# Patient Record
Sex: MALE | Race: BLACK OR AFRICAN AMERICAN | URBAN - METROPOLITAN AREA
[De-identification: names, ages, dates, MRNs, and addresses within clinical notes are randomized per-mention and may not be internally consistent; named-entity substitution may affect disease eponyms.]

---

## 2018-08-20 ENCOUNTER — OFFICE VISIT (OUTPATIENT)
Dept: INTERNAL MEDICINE CLINIC | Age: 68
End: 2018-08-20

## 2018-08-20 VITALS
SYSTOLIC BLOOD PRESSURE: 114 MMHG | WEIGHT: 210.3 LBS | TEMPERATURE: 98.1 F | OXYGEN SATURATION: 97 % | HEIGHT: 70 IN | HEART RATE: 63 BPM | BODY MASS INDEX: 30.11 KG/M2 | RESPIRATION RATE: 18 BRPM | DIASTOLIC BLOOD PRESSURE: 68 MMHG

## 2018-08-20 DIAGNOSIS — Z79.4 CONTROLLED TYPE 2 DIABETES MELLITUS WITH BOTH EYES AFFECTED BY MODERATE NONPROLIFERATIVE RETINOPATHY WITHOUT MACULAR EDEMA, WITH LONG-TERM CURRENT USE OF INSULIN (HCC): Primary | ICD-10-CM

## 2018-08-20 DIAGNOSIS — R19.5 OCCULT BLOOD IN STOOLS: ICD-10-CM

## 2018-08-20 DIAGNOSIS — E78.5 DYSLIPIDEMIA: ICD-10-CM

## 2018-08-20 DIAGNOSIS — I10 ESSENTIAL HYPERTENSION: ICD-10-CM

## 2018-08-20 DIAGNOSIS — N40.0 BENIGN PROSTATIC HYPERPLASIA WITHOUT LOWER URINARY TRACT SYMPTOMS: ICD-10-CM

## 2018-08-20 DIAGNOSIS — E11.3393 CONTROLLED TYPE 2 DIABETES MELLITUS WITH BOTH EYES AFFECTED BY MODERATE NONPROLIFERATIVE RETINOPATHY WITHOUT MACULAR EDEMA, WITH LONG-TERM CURRENT USE OF INSULIN (HCC): Primary | ICD-10-CM

## 2018-08-20 DIAGNOSIS — E03.9 ACQUIRED HYPOTHYROIDISM: ICD-10-CM

## 2018-08-20 RX ORDER — SIMVASTATIN 20 MG/1
20 TABLET, FILM COATED ORAL
COMMUNITY
End: 2018-12-26 | Stop reason: SDUPTHER

## 2018-08-20 RX ORDER — GUAIFENESIN 100 MG/5ML
81 LIQUID (ML) ORAL DAILY
COMMUNITY

## 2018-08-20 RX ORDER — METFORMIN HYDROCHLORIDE 500 MG/1
500 TABLET ORAL 2 TIMES DAILY WITH MEALS
COMMUNITY
End: 2019-07-01 | Stop reason: CLARIF

## 2018-08-20 RX ORDER — TAMSULOSIN HYDROCHLORIDE 0.4 MG/1
0.4 CAPSULE ORAL
COMMUNITY
End: 2018-11-05 | Stop reason: SDUPTHER

## 2018-08-20 RX ORDER — CHOLECALCIFEROL (VITAMIN D3) 125 MCG
100 CAPSULE ORAL DAILY
COMMUNITY

## 2018-08-20 RX ORDER — LISINOPRIL 20 MG/1
20 TABLET ORAL DAILY
COMMUNITY
End: 2019-07-05 | Stop reason: SDUPTHER

## 2018-08-20 RX ORDER — LEVOTHYROXINE SODIUM 100 UG/1
100 TABLET ORAL
COMMUNITY
End: 2019-06-03 | Stop reason: SDUPTHER

## 2018-08-20 NOTE — MR AVS SNAPSHOT
[facility] 
 
 
 [address] [address] 
[phone] Patient: Michelle Call MRN: [id number] BVW:[id number] Visit Information Date & Time Provider Department Dept. Phone Encounter #  
 8/20/2018 11:00 AM MD Fernanda Zamor[facility] [phone] [id number] Your Appointments 11/19/2018 11:30 AM  
Any with Bryan Seals MD  
[address] [place] [address] and Primary Care [facility] Appt Note: 3 MONTH FOLLOW UP  
 [address] [facility]  
  
   
 [address] [address] Upcoming Health Maintenance Date Due Hepatitis C Screening 1950 DTaP/Tdap/Td series (1 - Tdap) 4/30/1972 FOBT Q 1 YEAR AGE 50-75 4/30/2001 ZOSTER VACCINE AGE 60> 2/28/2011 GLAUCOMA SCREENING Q2Y 4/30/2016 Pneumococcal 65+ Low/Medium Risk (1 of 2 - PCV13) 4/30/2016 Influenza Age 5 to Adult 8/1/2018 Allergies as of 8/20/2018  Review Complete On: 8/20/2018 By: Pal Terrell No Known Allergies Current Immunizations  Never Reviewed No immunizations on file. Not reviewed this visit You Were Diagnosed With   
  
 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 
ICD-9-CM: 401.9 Dyslipidemia     ICD-10-CM: E78.5 ICD-9-CM: 272.4 Benign prostatic hyperplasia without lower urinary tract symptoms     ICD-10-CM: N40.0 ICD-9-CM: 600.00 Acquired hypothyroidism     ICD-10-CM: E03.9 ICD-9-CM: 244.9 Occult blood in stools     ICD-10-CM: R19.5 ICD-9-CM: 792.1 Vitals BP Pulse Temp Resp Height(growth percentile) Weight(growth percentile) 114/68 63 98.1 °F (36.7 °C) (Oral) 18 5' 10\" (1.778 m) 210 lb 4.8 oz (95.4 kg) SpO2 BMI Smoking Status 97% 30.17 kg/m2 Former Smoker Vitals History BMI and BSA Data Body Mass Index Body Surface Area  
 30.17 kg/m 2 2.17 m 2 Preferred Pharmacy Pharmacy Name Phone [facility], [address] [phone] Your Updated Medication List  
  
   
This list is accurate as of 8/20/18 12:55 PM.  Always use your most recent med list.  
  
  
  
  
 AGATHA CHEWABLE ASPIRIN 81 mg chewable tablet Generic drug:  aspirin Take 81 mg by mouth daily. COMPLETE MULTIVITAMIN PO Take 1 Tab by mouth daily. COQ-10 100 mg capsule Generic drug:  co-enzyme Q-10 Take 100 mg by mouth daily. FREESTYLE LITE METER  
by Does Not Apply route. FREESTYLE LITE STRIPS  
by In Vitro route. HumuLIN 70/30 U-100 Insulin 100 unit/mL (70-30) injection Generic drug:  insulin NPH/insulin regular  
by SubCUTAneous route Before breakfast and dinner. 10 units in the morning and 12 units at night. levothyroxine 100 mcg tablet Commonly known as:  SYNTHROID Take 100 mcg by mouth Daily (before breakfast). lisinopril 20 mg tablet Commonly known as:  Altman Sturgeon Take 20 mg by mouth daily. metFORMIN 1,000 mg tablet Commonly known as:  GLUCOPHAGE Take 1,000 mg by mouth two (2) times daily (with meals). simvastatin 20 mg tablet Commonly known as:  ZOCOR Take 20 mg by mouth nightly. tamsulosin 0.4 mg capsule Commonly known as:  FLOMAX Take 0.4 mg by mouth nightly. We Performed the Following AMB POC EKG ROUTINE W/ 12 LEADS, INTER & REP [68413 CPT(R)] APOLIPOPROTEIN B [id number] CPT(R)] CBC WITH AUTOMATED DIFF [60331 CPT(R)] COLLECTION VENOUS BLOOD,VENIPUNCTURE [id number] CPT(R)] HEMOGLOBIN A1C WITH EAG [42168 CPT(R)] LIPID PANEL [94120 CPT(R)] METABOLIC PANEL, COMPREHENSIVE [79355 CPT(R)] MICROALBUMIN, UR, RAND W/ MICROALB/CREAT RATIO [id number] CPT(R)] OCCULT BLOOD IMMUNOASSAY,DIAGNOSTIC [85247 CPT(R)] PSA, DIAGNOSTIC (PROSTATE SPECIFIC AG) [id number] CPT(R)] TSH 3RD GENERATION [55535 CPT(R)] Introducing [facility]! [facility] introduces [employer] patient portal. Now you can access parts of your medical record, email your doctor's office, and request medication refills online. 1. In your internet browser, go to [email or web address] 2. Click on the First Time User? Click Here link in the Sign In box. You will see the New Member Sign Up page. 3. Enter your [employer] Access Code exactly as it appears below. You will not need to use this code after youve completed the sign-up process. If you do not sign up before the expiration date, you must request a new code. · [employer] Access Code: THE [facility] Expires: 11/18/2018 12:55 PM 
 
4. Enter the last four digits of your Social Security Number (xxxx) and Date of Birth (mm/dd/yyyy) as indicated and click Submit. You will be taken to the next sign-up page. 5. Create a [employer] ID. This will be your [employer] login ID and cannot be changed, so think of one that is secure and easy to remember. 6. Create a [employer] password. You can change your password at any time. 7. Enter your Password Reset Question and Answer. This can be used at a later time if you forget your password. 8. Enter your e-mail address. You will receive e-mail notification when new information is available in [address] 9. Click Sign Up. You can now view and download portions of your medical record. 10. Click the Download Summary menu link to download a portable copy of your medical information. If you have questions, please visit the Frequently Asked Questions section of the [employer] website. Remember, [employer] is NOT to be used for urgent needs. For medical emergencies, dial 911. Now available from your iPhone and Android! Please provide this summary of care documentation to your next provider. Your primary care clinician is listed as Benito Mckeon. If you have any questions after today's visit, please call [phone].

## 2018-08-20 NOTE — PROGRESS NOTES
Chief Complaint   Patient presents with    New Patient     referral request to eye doctor, hx of diabetes, thyroid, HTN     1. Have you been to the ER, urgent care clinic since your last visit? Hospitalized since your last visit? No    2. Have you seen or consulted any other health care providers outside of the [address] [place] [address] since your last visit? Include any pap smears or colon screening.  No

## 2018-08-21 LAB
ALBUMIN SERPL-MCNC: 4.2 G/DL (ref 3.6–4.8)
ALBUMIN/CREAT UR: 2.7 MG/G CREAT (ref 0–30)
ALBUMIN/GLOB SERPL: 1.8 {RATIO} (ref 1.2–2.2)
ALP SERPL-CCNC: 111 IU/L (ref 39–117)
ALT SERPL-CCNC: 35 IU/L (ref 0–44)
APO B SERPL-MCNC: 94 MG/DL (ref 52–135)
AST SERPL-CCNC: 27 IU/L (ref 0–40)
BASOPHILS # BLD AUTO: 0 X10E3/UL (ref 0–0.2)
BASOPHILS NFR BLD AUTO: 1 %
BILIRUB SERPL-MCNC: 0.6 MG/DL (ref 0–1.2)
BUN SERPL-MCNC: 14 MG/DL (ref 8–27)
BUN/CREAT SERPL: 13 (ref 10–24)
CALCIUM SERPL-MCNC: 9.5 MG/DL (ref 8.6–10.2)
CHLORIDE SERPL-SCNC: 103 MMOL/L (ref 96–106)
CHOLEST SERPL-MCNC: 136 MG/DL (ref 100–199)
CO2 SERPL-SCNC: 25 MMOL/L (ref 20–29)
CREAT SERPL-MCNC: 1.09 MG/DL (ref 0.76–1.27)
CREAT UR-MCNC: 181.9 MG/DL
EOSINOPHIL # BLD AUTO: 0.1 X10E3/UL (ref 0–0.4)
EOSINOPHIL NFR BLD AUTO: 3 %
ERYTHROCYTE [DISTWIDTH] IN BLOOD BY AUTOMATED COUNT: 14 % (ref 12.3–15.4)
EST. AVERAGE GLUCOSE BLD GHB EST-MCNC: 192 MG/DL
GLOBULIN SER CALC-MCNC: 2.4 G/DL (ref 1.5–4.5)
GLUCOSE SERPL-MCNC: 114 MG/DL (ref 65–99)
HBA1C MFR BLD: 8.3 % (ref 4.8–5.6)
HCT VFR BLD AUTO: 44.5 % (ref 37.5–51)
HDLC SERPL-MCNC: 38 MG/DL
HGB BLD-MCNC: 15 G/DL (ref 13–17.7)
IMM GRANULOCYTES # BLD: 0 X10E3/UL (ref 0–0.1)
IMM GRANULOCYTES NFR BLD: 0 %
LDLC SERPL CALC-MCNC: 81 MG/DL (ref 0–99)
LYMPHOCYTES # BLD AUTO: 2 X10E3/UL (ref 0.7–3.1)
LYMPHOCYTES NFR BLD AUTO: 46 %
MCH RBC QN AUTO: 29.2 PG (ref 26.6–33)
MCHC RBC AUTO-ENTMCNC: 33.7 G/DL (ref 31.5–35.7)
MCV RBC AUTO: 87 FL (ref 79–97)
MICROALBUMIN UR-MCNC: 4.9 UG/ML
MONOCYTES # BLD AUTO: 0.3 X10E3/UL (ref 0.1–0.9)
MONOCYTES NFR BLD AUTO: 7 %
NEUTROPHILS # BLD AUTO: 1.8 X10E3/UL (ref 1.4–7)
NEUTROPHILS NFR BLD AUTO: 43 %
PLATELET # BLD AUTO: 265 X10E3/UL (ref 150–379)
POTASSIUM SERPL-SCNC: 4.7 MMOL/L (ref 3.5–5.2)
PROT SERPL-MCNC: 6.6 G/DL (ref 6–8.5)
PSA SERPL-MCNC: 0.3 NG/ML (ref 0–4)
RBC # BLD AUTO: 5.14 X10E6/UL (ref 4.14–5.8)
SODIUM SERPL-SCNC: 142 MMOL/L (ref 134–144)
TRIGL SERPL-MCNC: 86 MG/DL (ref 0–149)
TSH SERPL DL<=0.005 MIU/L-ACNC: 2.03 UIU/ML (ref 0.45–4.5)
VLDLC SERPL CALC-MCNC: 17 MG/DL (ref 5–40)
WBC # BLD AUTO: 4.3 X10E3/UL (ref 3.4–10.8)

## 2018-08-26 NOTE — ASSESSMENT & PLAN NOTE
Stable, based on history, physical exam and review of pertinent labs, studies and medications; meds reconciled; lifestyle modifications recommended, medication compliance emphasized. Key Antihyperglycemic Medications             metFORMIN (GLUCOPHAGE) 1,000 mg tablet  (Taking) Take 1,000 mg by mouth two (2) times daily (with meals). insulin NPH/insulin regular (HUMULIN 70/30 U-100 INSULIN) 100 unit/mL (70-30) injection  (Taking) by SubCUTAneous route Before breakfast and dinner. 10 units in the morning and 12 units at night. Other Key Diabetic Medications             lisinopril (PRINIVIL, ZESTRIL) 20 mg tablet  (Taking) Take 20 mg by mouth daily. simvastatin (ZOCOR) 20 mg tablet  (Taking) Take 20 mg by mouth nightly.         Lab Results   Component Value Date/Time    Hemoglobin A1c 8.3 08/20/2018 12:42 PM    Glucose 114 08/20/2018 12:42 PM    Creatinine 1.09 08/20/2018 12:42 PM    Microalb/Creat ratio (ug/mg creat.) 2.7 08/20/2018 12:42 PM    Cholesterol, total 136 08/20/2018 12:42 PM    HDL Cholesterol 38 08/20/2018 12:42 PM    LDL, calculated 81 08/20/2018 12:42 PM    Triglyceride 86 08/20/2018 12:42 PM     Diabetic Foot and Eye Exam HM Status   Topic Date Due    Diabetic Foot Care  04/30/1961    Eye Exam  04/30/1960

## 2018-08-26 NOTE — PROGRESS NOTES
[facility]  [facility]  [address] [facility] [address]  Phone:  [phone]  Fax: [phone]       Chief Complaint   Patient presents with    New Patient     referral request to eye doctor, hx of diabetes, thyroid, HTN   . SUBJECTIVE:    Danis Hall is a 80 y.o. male comes in as a new patient. He has had diabetes for well over 40 years. His control ha been good. He has had microvascular complications consisting of laser treatments years ago for his eyes. Apparently they have been stable of late. He states that his overall control is good with average sugars in the low 100 range ac breakfast and dinner. He has not had any interventional hypoglycemia. He has been taking premixed insulin. Unfortunately, his last dose has been taken at hs time which is inappropriate. He has had hypertension for at least 20+ years. He is also on a statin in view of his increased cardiovascular risk for at least 10+ years. Finally, he has had hypothyroidism for the last ten years. This was not pharmacologically induced. Current Outpatient Prescriptions   Medication Sig Dispense Refill    metFORMIN (GLUCOPHAGE) 1,000 mg tablet Take 1,000 mg by mouth two (2) times daily (with meals).  blood-glucose meter (FREESTYLE LITE METER) by Does Not Apply route.  blood sugar diagnostic (FREESTYLE LITE STRIPS) by In Vitro route.  co-enzyme Q-10 (COQ-10) 100 mg capsule Take 100 mg by mouth daily.  lisinopril (PRINIVIL, ZESTRIL) 20 mg tablet Take 20 mg by mouth daily.  aspirin (AGATHA CHEWABLE ASPIRIN) 81 mg chewable tablet Take 81 mg by mouth daily.  tamsulosin (FLOMAX) 0.4 mg capsule Take 0.4 mg by mouth nightly.  simvastatin (ZOCOR) 20 mg tablet Take 20 mg by mouth nightly.  levothyroxine (SYNTHROID) 100 mcg tablet Take 100 mcg by mouth Daily (before breakfast).       multivit,tx with iron,minerals (COMPLETE MULTIVITAMIN PO) Take 1 Tab by mouth daily.  insulin NPH/insulin regular (HUMULIN 70/30 U-100 INSULIN) 100 unit/mL (70-30) injection by SubCUTAneous route Before breakfast and dinner. 10 units in the morning and 12 units at night.        Past Medical History:   Diagnosis Date    Diabetes ([address]     Hypertension      Past Surgical History:   Procedure Laterality Date    ABDOMEN SURGERY PROC UNLISTED      hemmoroidectomy    HX COLONOSCOPY  2015    Joe----unremarkable    HX HEENT      laser for retinopathy    HX ORTHOPAEDIC      achilles tendon surgery    HX REFRACTIVE SURGERY       No Known Allergies      REVIEW OF SYSTEMS:  General: negative for - chills or fever  ENT: negative for - headaches, nasal congestion or tinnitus  Respiratory: negative for - cough, hemoptysis, shortness of breath or wheezing  Cardiovascular : negative for - chest pain, edema, palpitations or shortness of breath  Gastrointestinal: negative for - abdominal pain, blood in stools, heartburn or nausea/vomiting  Genito-Urinary: no dysuria, trouble voiding, or hematuria  Musculoskeletal: negative for - gait disturbance, joint pain, joint stiffness or joint swelling  Neurological: no TIA or stroke symptoms  Hematologic: no bruises, no bleeding, no swollen glands  Integument: no lumps, mole changes, nail changes or rash  Endocrine: no malaise/lethargy or unexpected weight changes      Social History     Social History    Marital status: UNKNOWN     Spouse name: N/A    Number of children: 4    Years of education: N/A     Occupational History    retired [place] [address] Alonso Rosales retired [employer] after 5 years      Social History Main Topics    Smoking status: Former Smoker    Smokeless tobacco: Never Used    Alcohol use 0.6 oz/week     1 Glasses of wine per week    Drug use: No    Sexual activity: Yes     Other Topics Concern    None     Social History Narrative     Family History   Problem Relation Age of Onset    Heart Disease Mother OBJECTIVE:    Visit Vitals    /68    Pulse 63    Temp 98.1 °F (36.7 °C) (Oral)    Resp 18    Ht 5' 10\" (1.778 m)    Wt 210 lb 4.8 oz (95.4 kg)    SpO2 97%    BMI 30.17 kg/m2     CONSTITUTIONAL: well , well nourished, appears age appropriate  EYES: perrla, eom intact  ENMT:moist mucous membranes, pharynx clear  NECK: supple. Thyroid normal  RESPIRATORY: Chest: clear to ascultation and percussion   CARDIOVASCULAR: Heart: regular rate and rhythm  GASTROINTESTINAL: Abdomen: soft, bowel sounds active  HEMATOLOGIC: no pathological lymph nodes palpated  MUSCULOSKELETAL: Extremities: no edema, pulse 1+   INTEGUMENT: No unusual rashes or suspicious skin lesions noted. Nails appear normal.  NEUROLOGIC: non-focal exam   MENTAL STATUS: alert and oriented, appropriate affect      ASSESSMENT:  1. 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 Essential hypertension    3. Dyslipidemia    4. Acquired hypothyroidism    5. Benign prostatic hyperplasia without lower urinary tract symptoms    6. Occult blood in stools        PLAN:    1. His diabetes hopefully is well controlled. I will await the results of the hemoglobin A1c. I do remind him of the importance of taking his premixed insulin before meals. This means ac breakfast and dinner. 2. Blood pressure is excellent today, no adjustments are made. 3. I will await the results of his lipid profile to see if any further adjustments need occur. 4. He is hypothyroid and he is compensated currently. I will await the results of his TSH to determine if adjustments need be made. 5. He seems to be quite physically active and I encouraged him to continue this.       .  Orders Placed This Encounter    APOLIPOPROTEIN B    CBC WITH AUTOMATED DIFF    LIPID PANEL    METABOLIC PANEL, COMPREHENSIVE    PROSTATE SPECIFIC AG    HEMOGLOBIN A1C WITH EAG    MICROALBUMIN, UR, RAND W/ MICROALB/CREAT RATIO    OCCULT BLOOD IMMUNOASSAY,DIAGNOSTIC    TSH 3RD GENERATION    AMB POC EKG ROUTINE W/ 12 LEADS, INTER & REP    metFORMIN (GLUCOPHAGE) 1,000 mg tablet    blood-glucose meter (FREESTYLE LITE METER)    blood sugar diagnostic (FREESTYLE LITE STRIPS)    co-enzyme Q-10 (COQ-10) 100 mg capsule    lisinopril (PRINIVIL, ZESTRIL) 20 mg tablet    aspirin (AGATHA CHEWABLE ASPIRIN) 81 mg chewable tablet    tamsulosin (FLOMAX) 0.4 mg capsule    simvastatin (ZOCOR) 20 mg tablet    levothyroxine (SYNTHROID) 100 mcg tablet    multivit,tx with iron,minerals (COMPLETE MULTIVITAMIN PO)    insulin NPH/insulin regular (HUMULIN 70/30 U-100 INSULIN) 100 unit/mL (70-30) injection         Follow-up Disposition:  Return in about 3 months (around 11/20/2018).       Zuleika Huggins MD

## 2018-09-07 LAB — HEMOCCULT STL QL IA: NEGATIVE

## 2018-09-11 ENCOUNTER — OFFICE VISIT (OUTPATIENT)
Dept: INTERNAL MEDICINE CLINIC | Age: 68
End: 2018-09-11

## 2018-09-11 VITALS
DIASTOLIC BLOOD PRESSURE: 70 MMHG | RESPIRATION RATE: 16 BRPM | BODY MASS INDEX: 30.56 KG/M2 | WEIGHT: 213.5 LBS | HEART RATE: 63 BPM | TEMPERATURE: 98 F | OXYGEN SATURATION: 96 % | SYSTOLIC BLOOD PRESSURE: 122 MMHG | HEIGHT: 70 IN

## 2018-09-11 DIAGNOSIS — Z00.00 WELLNESS EXAMINATION: ICD-10-CM

## 2018-09-11 DIAGNOSIS — I10 ESSENTIAL HYPERTENSION: ICD-10-CM

## 2018-09-11 DIAGNOSIS — Z79.4 CONTROLLED TYPE 2 DIABETES MELLITUS WITH BOTH EYES AFFECTED BY MODERATE NONPROLIFERATIVE RETINOPATHY WITHOUT MACULAR EDEMA, WITH LONG-TERM CURRENT USE OF INSULIN (HCC): Primary | ICD-10-CM

## 2018-09-11 DIAGNOSIS — Z23 ENCOUNTER FOR IMMUNIZATION: ICD-10-CM

## 2018-09-11 DIAGNOSIS — Z13.31 SCREENING FOR DEPRESSION: ICD-10-CM

## 2018-09-11 DIAGNOSIS — Z13.39 SCREENING FOR ALCOHOLISM: ICD-10-CM

## 2018-09-11 DIAGNOSIS — E11.3393 CONTROLLED TYPE 2 DIABETES MELLITUS WITH BOTH EYES AFFECTED BY MODERATE NONPROLIFERATIVE RETINOPATHY WITHOUT MACULAR EDEMA, WITH LONG-TERM CURRENT USE OF INSULIN (HCC): Primary | ICD-10-CM

## 2018-09-11 DIAGNOSIS — E78.5 DYSLIPIDEMIA: ICD-10-CM

## 2018-09-11 DIAGNOSIS — E66.09 CLASS 1 OBESITY DUE TO EXCESS CALORIES WITH SERIOUS COMORBIDITY AND BODY MASS INDEX (BMI) OF 30.0 TO 30.9 IN ADULT: ICD-10-CM

## 2018-09-11 LAB — GLUCOSE POC: 99 MG/DL

## 2018-09-11 NOTE — MR AVS SNAPSHOT
Paul Mayo 
 
 
 [address] [address] 
[phone] Patient: Aleida Velasco MRN: [id number] Visit Information Date & Time Provider Department Dept. Phone Encounter #  
 9/11/2018 11:30 AM Kayode Marc MD [facility] and [place] [address] [id number] Your Appointments 11/19/2018 11:30 AM  
Any with Kayode Marc MD  
[address] [place] [address] and Primary Care [address] [place] [address]) Appt Note: 3 MONTH FOLLOW UP  
 [address] 1 [facility]  
  
   
 [address] [id number] Upcoming Health Maintenance Date Due Hepatitis C Screening 1950 FOOT EXAM Q1 4/30/1961 DTaP/Tdap/Td series (1 - Tdap) 4/30/1972 ZOSTER VACCINE AGE 60> 2/28/2011 GLAUCOMA SCREENING Q2Y 4/30/2016 Influenza Age 5 to Adult 8/1/2018 EYE EXAM RETINAL OR DILATED Q1 10/11/2018* Pneumococcal 65+ Low/Medium Risk (1 of 2 - PCV13) 3/11/2019* HEMOGLOBIN A1C Q6M 2/20/2019 MICROALBUMIN Q1 8/20/2019 LIPID PANEL Q1 8/20/2019 FOBT Q 1 YEAR AGE 50-75 9/4/2019 *Topic was postponed. The date shown is not the original due date. Allergies as of 9/11/2018  Review Complete On: 9/11/2018 By: Paola Mathew No Known Allergies Current Immunizations  Never Reviewed Name Date Influenza High Dose Vaccine PF  Incomplete Not reviewed this visit You Were Diagnosed With   
  
 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     ICD-10-CM: B34.0658, Z79.4 ICD-9-CM: 250.50, 362.05, V58.67 Vitals BP Pulse Temp Resp Height(growth percentile) Weight(growth percentile) 122/70 63 98 °F (36.7 °C) (Oral) 16 5' 10\" (1.778 m) 213 lb 8 oz (96.8 kg) SpO2 BMI Smoking Status 96% 30.63 kg/m2 Former Smoker Vitals History BMI and BSA Data Body Mass Index Body Surface Area  
 30.63 kg/m 2 2.19 m 2 Preferred Pharmacy Pharmacy Name Phone [facility] [address], [address] [phone] Your Updated Medication List  
  
   
This list is accurate as of 9/11/18 12:54 PM.  Always use your most recent med list.  
  
  
  
  
 AGATHA CHEWABLE ASPIRIN 81 mg chewable tablet Generic drug:  aspirin Take 81 mg by mouth daily. COMPLETE MULTIVITAMIN PO Take 1 Tab by mouth daily. COQ-10 100 mg capsule Generic drug:  co-enzyme Q-10 Take 100 mg by mouth daily. FREESTYLE LITE METER  
by Does Not Apply route. FREESTYLE LITE STRIPS  
by In Vitro route. HumuLIN 70/30 U-100 Insulin 100 unit/mL (70-30) injection Generic drug:  insulin NPH/insulin regular  
by SubCUTAneous route Before breakfast and dinner. 12 units in the morning and 15 units at night. levothyroxine 100 mcg tablet Commonly known as:  SYNTHROID Take 100 mcg by mouth Daily (before breakfast). lisinopril 20 mg tablet Commonly known as:  Bonnita Silk Take 20 mg by mouth daily. metFORMIN 1,000 mg tablet Commonly known as:  GLUCOPHAGE Take 1,000 mg by mouth two (2) times daily (with meals). simvastatin 20 mg tablet Commonly known as:  ZOCOR Take 20 mg by mouth nightly. tamsulosin 0.4 mg capsule Commonly known as:  FLOMAX Take 0.4 mg by mouth nightly. We Performed the Following AMB POC GLUCOSE BLOOD, BY GLUCOSE MONITORING DEVICE [14013 CPT(R)] INFLUENZA VIRUS VACCINE, HIGH DOSE SEASONAL, PRESERVATIVE FREE [41038 CPT(R)] [place] IMMUNIZ ADMIN,1 SINGLE/COMB VAC/TOXOID [id number] CPT(R)] Patient Instructions Vaccine Information Statement Influenza (Flu) Vaccine (Inactivated or Recombinant): What you need to know Many Vaccine Information Statements are available in Danish and other languages. See www.immunize.org/vis Hojas de Información Sobre Vacunas están disponibles en Español y en muchos otros idiomas. Visite www.immunize.org/vis 1. Why get vaccinated? Influenza (flu) is a contagious disease that spreads around the [facility] every year, usually between October and May. Flu is caused by influenza viruses, and is spread mainly by coughing, sneezing, and close contact. Anyone can get flu. Flu strikes suddenly and can last several days. Symptoms vary by age, but can include: 
 fever/chills  sore throat  muscle aches  fatigue  cough  headache  runny or stuffy nose Flu can also lead to pneumonia and blood infections, and cause diarrhea and seizures in children. If you have a medical condition, such as heart or lung disease, flu can make it worse. Flu is more dangerous for some people. Infants and young children, people 72years of age and older, pregnant women, and people with certain health conditions or a weakened immune system are at greatest risk. Each year thousands of people in the [facility] die from flu, and many more are hospitalized. Flu vaccine can: 
 keep you from getting flu, 
 make flu less severe if you do get it, and 
 keep you from spreading flu to your family and other people. 2. Inactivated and recombinant flu vaccines A dose of flu vaccine is recommended every flu season. Children 6 months through 6years of age may need two doses during the same flu season. Everyone else needs only one dose each flu season. Some inactivated flu vaccines contain a very small amount of a mercury-based preservative called thimerosal. Studies have not shown thimerosal in vaccines to be harmful, but flu vaccines that do not contain thimerosal are available. There is no live flu virus in flu shots. They cannot cause the flu. There are many flu viruses, and they are always changing. Each year a new flu vaccine is made to protect against three or four viruses that are likely to cause disease in the upcoming flu season. But even when the vaccine doesnt exactly match these viruses, it may still provide some protection Flu vaccine cannot prevent: 
 flu that is caused by a virus not covered by the vaccine, or 
 illnesses that look like flu but are not. It takes about 2 weeks for protection to develop after vaccination, and protection lasts through the flu season. 3. Some people should not get this vaccine Tell the person who is giving you the vaccine:  If you have any severe, life-threatening allergies. If you ever had a life-threatening allergic reaction after a dose of flu vaccine, or have a severe allergy to any part of this vaccine, you may be advised not to get vaccinated. Most, but not all, types of flu vaccine contain a small amount of egg protein.  If you ever had Guillain-Barré Syndrome (also called GBS). Some people with a history of GBS should not get this vaccine. This should be discussed with your doctor.  If you are not feeling well. It is usually okay to get flu vaccine when you have a mild illness, but you might be asked to come back when you feel better. 4. Risks of a vaccine reaction With any medicine, including vaccines, there is a chance of reactions. These are usually mild and go away on their own, but serious reactions are also possible. Most people who get a flu shot do not have any problems with it. Minor problems following a flu shot include:  
 soreness, redness, or swelling where the shot was given  hoarseness  sore, red or itchy eyes  cough  fever  aches  headache  itching  fatigue If these problems occur, they usually begin soon after the shot and last 1 or 2 days. More serious problems following a flu shot can include the following:  There may be a small increased risk of Guillain-Barré Syndrome (GBS) after inactivated flu vaccine. This risk has been estimated at 1 or 2 additional cases per million people vaccinated. This is much lower than the risk of severe complications from flu, which can be prevented by flu vaccine.  Young children who get the flu shot along with pneumococcal vaccine (PCV13) and/or DTaP vaccine at the same time might be slightly more likely to have a seizure caused by fever. Ask your doctor for more information. Tell your doctor if a child who is getting flu vaccine has ever had a seizure. Problems that could happen after any injected vaccine:  People sometimes faint after a medical procedure, including vaccination. Sitting or lying down for about 15 minutes can help prevent fainting, and injuries caused by a fall. Tell your doctor if you feel dizzy, or have vision changes or ringing in the ears.  Some people get severe pain in the shoulder and have difficulty moving the arm where a shot was given. This happens very rarely.  Any medication can cause a severe allergic reaction. Such reactions from a vaccine are very rare, estimated at about 1 in a million doses, and would happen within a few minutes to a few hours after the vaccination. As with any medicine, there is a very remote chance of a vaccine causing a serious injury or death. The safety of vaccines is always being monitored. For more information, visit: www.cdc.gov/vaccinesafety/ 
 
5. What if there is a serious reaction? What should I look for?  Look for anything that concerns you, such as signs of a severe allergic reaction, very high fever, or unusual behavior.  
 
Signs of a severe allergic reaction can include hives, swelling of the face and throat, difficulty breathing, a fast heartbeat, dizziness, and weakness  usually within a few minutes to a few hours after the vaccination. What should I do?  If you think it is a severe allergic reaction or other emergency that cant wait, call 9-1-1 and get the person to the nearest hospital. Otherwise, call your doctor.  Reactions should be reported to the Vaccine Adverse Event Reporting System (VAERS). Your doctor should file this report, or you can do it yourself through  the VAERS web site at www.vaers. [facility].gov, or by calling 8-746.801.2670. VAERS does not give medical advice. 6. The National Vaccine Injury Compensation Program 
 
The [facility] Vaccine Injury Compensation Program (VICP) is a federal program that was created to compensate people who may have been injured by certain vaccines. Persons who believe they may have been injured by a vaccine can learn about the program and about filing a claim by calling 1-950.855.5869 or visiting the [facility] [place] [address] website at www.[facility].gov/vaccinecompensation. There is a time limit to file a claim for compensation. 7. How can I learn more?  Ask your healthcare provider. He or she can give you the vaccine package insert or suggest other sources of information.  Call your local or state health department.  Contact the Centers for Disease Control and Prevention (CDC): 
- Call 6-313.257.7831 (1-800-CDC-INFO) or 
- Visit CDCs website at www.cdc.gov/flu Vaccine Information Statement Inactivated Influenza Vaccine 8/7/2015 
[address] [employer] [id number]-[id number] [facility] and [employer] Centers for Disease Control and Prevention Office Use Only Introducing [facility]! [facility] introduces [employer] patient portal. Now you can access parts of your medical record, email your doctor's office, and request medication refills online. 1. In your internet browser, go to [email or web address]. [email or web address] Chip 2. Click on the First Time User? Click Here link in the Sign In box.  You will see the New Member Sign Up page. 3. Enter your Hypercontextt Access Code exactly as it appears below. You will not need to use this code after youve completed the sign-up process. If you do not sign up before the expiration date, you must request a new code. · Hypercontextt Access Code: THE [facility] Expires: 11/18/2018 12:55 PM 
 
4. Enter the last four digits of your Social Security Number (xxxx) and Date of Birth (mm/dd/yyyy) as indicated and click Submit. You will be taken to the next sign-up page. 5. Create a Hypercontextt ID. This will be your [employer] login ID and cannot be changed, so think of one that is secure and easy to remember. 6. Create a [employer] password. You can change your password at any time. 7. Enter your Password Reset Question and Answer. This can be used at a later time if you forget your password. 8. Enter your e-mail address. You will receive e-mail notification when new information is available in [address]. 9. Click Sign Up. You can now view and download portions of your medical record. 10. Click the Download Summary menu link to download a portable copy of your medical information. If you have questions, please visit the Frequently Asked Questions section of the [employer] website. Remember, [employer] is NOT to be used for urgent needs. For medical emergencies, dial 911. Now available from your iPhone and Android! Please provide this summary of care documentation to your next provider. Your primary care clinician is listed as Benito Mckeon. If you have any questions after today's visit, please call [phone].

## 2018-09-11 NOTE — PROGRESS NOTES
Chief Complaint Patient presents with [facility] Annual Wellness Visit 1. Have you been to the ER, urgent care clinic since your last visit? Hospitalized since your last visit? No 
 
2. Have you seen or consulted any other health care providers outside of the [address] [place] [address] since your last visit? Include any pap smears or colon screening.  No

## 2018-09-11 NOTE — PATIENT INSTRUCTIONS
Vaccine Information Statement Influenza (Flu) Vaccine (Inactivated or Recombinant): What you need to know Many Vaccine Information Statements are available in Frisian and other languages. See www.immunize.org/vis Hojas de Información Sobre Vacunas están disponibles en Español y en muchos otros idiomas. Visite www.immunize.org/vis 1. Why get vaccinated? Influenza (flu) is a contagious disease that spreads around the [place] every year, usually between October and May. Flu is caused by influenza viruses, and is spread mainly by coughing, sneezing, and close contact. Anyone can get flu. Flu strikes suddenly and can last several days. Symptoms vary by age, but can include: 
 fever/chills  sore throat  muscle aches  fatigue  cough  headache  runny or stuffy nose Flu can also lead to pneumonia and blood infections, and cause diarrhea and seizures in children. If you have a medical condition, such as heart or lung disease, flu can make it worse. Flu is more dangerous for some people. Infants and young children, people 72years of age and older, pregnant women, and people with certain health conditions or a weakened immune system are at greatest risk. Each year thousands of people in the [facility] die from flu, and many more are hospitalized. Flu vaccine can: 
 keep you from getting flu, 
 make flu less severe if you do get it, and 
 keep you from spreading flu to your family and other people. 2. Inactivated and recombinant flu vaccines A dose of flu vaccine is recommended every flu season. Children 6 months through 6years of age may need two doses during the same flu season. Everyone else needs only one dose each flu season.   
 
 
Some inactivated flu vaccines contain a very small amount of a mercury-based preservative called thimerosal. Studies have not shown thimerosal in vaccines to be harmful, but flu vaccines that do not contain thimerosal are available. There is no live flu virus in flu shots. They cannot cause the flu. There are many flu viruses, and they are always changing. Each year a new flu vaccine is made to protect against three or four viruses that are likely to cause disease in the upcoming flu season. But even when the vaccine doesnt exactly match these viruses, it may still provide some protection Flu vaccine cannot prevent: 
 flu that is caused by a virus not covered by the vaccine, or 
 illnesses that look like flu but are not. It takes about 2 weeks for protection to develop after vaccination, and protection lasts through the flu season. 3. Some people should not get this vaccine Tell the person who is giving you the vaccine:  If you have any severe, life-threatening allergies. If you ever had a life-threatening allergic reaction after a dose of flu vaccine, or have a severe allergy to any part of this vaccine, you may be advised not to get vaccinated. Most, but not all, types of flu vaccine contain a small amount of egg protein.  If you ever had Guillain-Barré Syndrome (also called GBS). Some people with a history of GBS should not get this vaccine. This should be discussed with your doctor.  If you are not feeling well. It is usually okay to get flu vaccine when you have a mild illness, but you might be asked to come back when you feel better. 4. Risks of a vaccine reaction With any medicine, including vaccines, there is a chance of reactions. These are usually mild and go away on their own, but serious reactions are also possible. Most people who get a flu shot do not have any problems with it. Minor problems following a flu shot include:  
 soreness, redness, or swelling where the shot was given  hoarseness  sore, red or itchy eyes  cough  fever  aches  headache  itching  fatigue If these problems occur, they usually begin soon after the shot and last 1 or 2 days. More serious problems following a flu shot can include the following:  There may be a small increased risk of Guillain-Barré Syndrome (GBS) after inactivated flu vaccine. This risk has been estimated at 1 or 2 additional cases per million people vaccinated. This is much lower than the risk of severe complications from flu, which can be prevented by flu vaccine.  Young children who get the flu shot along with pneumococcal vaccine (PCV13) and/or DTaP vaccine at the same time might be slightly more likely to have a seizure caused by fever. Ask your doctor for more information. Tell your doctor if a child who is getting flu vaccine has ever had a seizure. Problems that could happen after any injected vaccine:  People sometimes faint after a medical procedure, including vaccination. Sitting or lying down for about 15 minutes can help prevent fainting, and injuries caused by a fall. Tell your doctor if you feel dizzy, or have vision changes or ringing in the ears.  Some people get severe pain in the shoulder and have difficulty moving the arm where a shot was given. This happens very rarely.  Any medication can cause a severe allergic reaction. Such reactions from a vaccine are very rare, estimated at about 1 in a million doses, and would happen within a few minutes to a few hours after the vaccination. As with any medicine, there is a very remote chance of a vaccine causing a serious injury or death. The safety of vaccines is always being monitored. For more information, visit: www.cdc.gov/vaccinesafety/ 
 
5. What if there is a serious reaction? What should I look for?  Look for anything that concerns you, such as signs of a severe allergic reaction, very high fever, or unusual behavior.  
 
Signs of a severe allergic reaction can include hives, swelling of the face and throat, difficulty breathing, a fast heartbeat, dizziness, and weakness  usually within a few minutes to a few hours after the vaccination. What should I do?  If you think it is a severe allergic reaction or other emergency that cant wait, call 9-1-1 and get the person to the nearest hospital. Otherwise, call your doctor.  Reactions should be reported to the Vaccine Adverse Event Reporting System (VAERS). Your doctor should file this report, or you can do it yourself through  the VAERS web site at www.vaers. [facility].gov, or by calling 3-421.315.5677. VAERS does not give medical advice. 6. The National Vaccine Injury Compensation Program 
 
The [facility] Vaccine Injury Compensation Program (VICP) is a federal program that was created to compensate people who may have been injured by certain vaccines. Persons who believe they may have been injured by a vaccine can learn about the program and about filing a claim by calling 8-828.392.7498 or visiting the [address] website at www.[facility].gov/vaccinecompensation. There is a time limit to file a claim for compensation. 7. How can I learn more?  Ask your healthcare provider. He or she can give you the vaccine package insert or suggest other sources of information.  Call your local or state health department.  Contact the Centers for Disease Control and Prevention (CDC): 
- Call 0-890.624.9972 (1-800-CDC-INFO) or 
- Visit CDCs website at www.cdc.gov/flu Vaccine Information Statement Inactivated Influenza Vaccine 8/7/2015 
42 SANTIAGO Balderas [id number] [facility] and [employer] Centers for Disease Control and Prevention Office Use Only Medicare Wellness Visit, Male The best way to live healthy is to have a lifestyle where you eat a well-balanced diet, exercise regularly, limit alcohol use, and quit all forms of tobacco/nicotine, if applicable. Regular preventive services are another way to keep healthy.  Preventive services (vaccines, screening tests, monitoring & exams) can help personalize your care plan, which helps you manage your own care. Screening tests can find health problems at the earliest stages, when they are easiest to treat. [address] Angel[address] Lakhani follows the current, evidence-based guidelines published by the [facility] William Dial ([facility]) when recommending preventive services for our patients. Because we follow these guidelines, sometimes recommendations change over time as research supports it. (For example, a prostate screening blood test is no longer routinely recommended for men with no symptoms.) Of course, you and your doctor may decide to screen more often for some diseases, based on your risk and co-morbidities (chronic disease you are already diagnosed with). Preventive services for you include: - Medicare offers their members a free annual wellness visit, which is time for you and your primary care provider to discuss and plan for your preventive service needs. Take advantage of this benefit every year! 
-All adults over age 72 should receive the recommended pneumonia vaccines. Current USPSTF guidelines recommend a series of two vaccines for the best pneumonia protection.  
-All adults should have a flu vaccine yearly and an ECG.  All adults age 61 and older should receive a shingles vaccine once in their lifetime.   
-All adults age 38-68 who are overweight should have a diabetes screening test once every three years.  
-Other screening tests & preventive services for persons with diabetes include: an eye exam to screen for diabetic retinopathy, a kidney function test, a foot exam, and stricter control over your cholesterol.  
-Cardiovascular screening for adults with routine risk involves an electrocardiogram (ECG) at intervals determined by the provider.  
-Colorectal cancer screening should be done for adults age 54-65 with no increased risk factors for colorectal cancer. There are a number of acceptable methods of screening for this type of cancer. Each test has its own benefits and drawbacks. Discuss with your provider what is most appropriate for you during your annual wellness visit. The different tests include: colonoscopy (considered the best screening method), a fecal occult blood test, a fecal DNA test, and sigmoidoscopy. 
-All adults born between [facility] should be screened once for Hepatitis C. 
-An Abdominal Aortic Aneurysm (AAA) Screening is recommended for men age 73-68 who has ever smoked in their lifetime. Here is a list of your current Health Maintenance items (your personalized list of preventive services) with a due date: 
Health Maintenance Due Topic Date Due  
 Hepatitis C Test  1950 [place] Diabetic Foot Care  04/30/1961 [place] Eye Exam  04/30/1961  Glaucoma Screening   04/30/2016

## 2018-10-04 ENCOUNTER — OFFICE VISIT (OUTPATIENT)
Dept: INTERNAL MEDICINE CLINIC | Age: 68
End: 2018-10-04

## 2018-10-04 VITALS
OXYGEN SATURATION: 98 % | RESPIRATION RATE: 16 BRPM | TEMPERATURE: 97.6 F | DIASTOLIC BLOOD PRESSURE: 67 MMHG | HEART RATE: 70 BPM | WEIGHT: 214.7 LBS | HEIGHT: 70 IN | BODY MASS INDEX: 30.74 KG/M2 | SYSTOLIC BLOOD PRESSURE: 119 MMHG

## 2018-10-04 DIAGNOSIS — Z79.4 CONTROLLED TYPE 2 DIABETES MELLITUS WITH BOTH EYES AFFECTED BY MODERATE NONPROLIFERATIVE RETINOPATHY WITHOUT MACULAR EDEMA, WITH LONG-TERM CURRENT USE OF INSULIN (HCC): ICD-10-CM

## 2018-10-04 DIAGNOSIS — E78.5 DYSLIPIDEMIA: ICD-10-CM

## 2018-10-04 DIAGNOSIS — E11.3393 CONTROLLED TYPE 2 DIABETES MELLITUS WITH BOTH EYES AFFECTED BY MODERATE NONPROLIFERATIVE RETINOPATHY WITHOUT MACULAR EDEMA, WITH LONG-TERM CURRENT USE OF INSULIN (HCC): ICD-10-CM

## 2018-10-04 DIAGNOSIS — I10 ESSENTIAL HYPERTENSION: ICD-10-CM

## 2018-10-04 DIAGNOSIS — Z01.818 PREOPERATIVE EXAMINATION: Primary | ICD-10-CM

## 2018-10-04 DIAGNOSIS — E03.9 ACQUIRED HYPOTHYROIDISM: ICD-10-CM

## 2018-10-04 NOTE — MR AVS SNAPSHOT
[facility] 
 
 
 [address] [address] 
[phone] Patient: Connor Bustos MRN: [id number] RYN:[id number] Visit Information Date & Time Provider Department Dept. Phone Encounter #  
 10/4/2018  2:45 PM Marcello Howell 80 Sports Medicine and Primary Care [phone] [id number] Your Appointments 12/11/2018 11:30 AM  
Any with Ish Londono MD  
[address] [place] [address] and Primary Care [address] [place] [address]) Appt Note: 3 month follow up  
 [address] 1 [facility]  
  
   
 [address] [id number] Upcoming Health Maintenance Date Due Hepatitis C Screening 1950 FOOT EXAM Q1 4/30/1961 GLAUCOMA SCREENING Q2Y 4/30/2016 EYE EXAM RETINAL OR DILATED Q1 10/11/2018* DTaP/Tdap/Td series (1 - Tdap) 1/4/2019* Shingrix Vaccine Age 50> (1 of 2) 1/4/2019* Pneumococcal 65+ Low/Medium Risk (1 of 2 - PCV13) 3/11/2019* HEMOGLOBIN A1C Q6M 2/20/2019 MICROALBUMIN Q1 8/20/2019 LIPID PANEL Q1 8/20/2019 FOBT Q 1 YEAR AGE 50-75 9/4/2019 *Topic was postponed. The date shown is not the original due date. Allergies as of 10/4/2018  Review Complete On: 10/4/2018 By: Jessica Bellamy No Known Allergies Current Immunizations  Never Reviewed Name Date Influenza High Dose Vaccine PF 9/11/2018 Not reviewed this visit Vitals BP Pulse Temp Resp Height(growth percentile) Weight(growth percentile)  
 119/67 70 97.6 °F (36.4 °C) (Oral) 16 5' 10\" (1.778 m) 214 lb 11.2 oz (97.4 kg) SpO2 BMI Smoking Status 98% 30.81 kg/m2 Former Smoker Vitals History BMI and BSA Data Body Mass Index Body Surface Area  
 30.81 kg/m 2 2.19 m 2 Preferred Pharmacy Pharmacy Name Phone [facility] [address], [address] [phone] Your Updated Medication List  
  
   
 This list is accurate as of 10/4/18  3:25 PM.  Always use your most recent med list.  
  
  
  
  
 AGATHA CHEWABLE ASPIRIN 81 mg chewable tablet Generic drug:  aspirin Take 81 mg by mouth daily. COMPLETE MULTIVITAMIN PO Take 1 Tab by mouth daily. COQ-10 100 mg capsule Generic drug:  co-enzyme Q-10 Take 100 mg by mouth daily. FREESTYLE LITE METER  
by Does Not Apply route. FREESTYLE LITE STRIPS  
by In Vitro route. HumuLIN 70/30 U-100 Insulin 100 unit/mL (70-30) injection Generic drug:  insulin NPH/insulin regular  
by SubCUTAneous route Before breakfast and dinner. 12 units in the morning and 15 units at night. levothyroxine 100 mcg tablet Commonly known as:  SYNTHROID Take 100 mcg by mouth Daily (before breakfast). lisinopril 20 mg tablet Commonly known as:  Olive Millan Take 20 mg by mouth daily. metFORMIN 1,000 mg tablet Commonly known as:  GLUCOPHAGE Take 1,000 mg by mouth two (2) times daily (with meals). simvastatin 20 mg tablet Commonly known as:  ZOCOR Take 20 mg by mouth nightly. tamsulosin 0.4 mg capsule Commonly known as:  FLOMAX Take 0.4 mg by mouth nightly. Introducing [facility]! [facility] introduces [employer] patient portal. Now you can access parts of your medical record, email your doctor's office, and request medication refills online. 1. In your internet browser, go to [email or web address] 2. Click on the First Time User? Click Here link in the Sign In box. You will see the New Member Sign Up page. 3. Enter your [employer] Access Code exactly as it appears below. You will not need to use this code after youve completed the sign-up process. If you do not sign up before the expiration date, you must request a new code. · [employer] Access Code: THE [facility] Expires: 11/18/2018 12:55 PM 
 
 4. Enter the last four digits of your Social Security Number (xxxx) and Date of Birth (mm/dd/yyyy) as indicated and click Submit. You will be taken to the next sign-up page. 5. Create a joiz ID. This will be your joiz login ID and cannot be changed, so think of one that is secure and easy to remember. 6. Create a joiz password. You can change your password at any time. 7. Enter your Password Reset Question and Answer. This can be used at a later time if you forget your password. 8. Enter your e-mail address. You will receive e-mail notification when new information is available in [address] 9. Click Sign Up. You can now view and download portions of your medical record. 10. Click the Download Summary menu link to download a portable copy of your medical information. If you have questions, please visit the Frequently Asked Questions section of the joiz website. Remember, joiz is NOT to be used for urgent needs. For medical emergencies, dial 911. Now available from your iPhone and Android! Please provide this summary of care documentation to your next provider. Your primary care clinician is listed as Benito Mckeon. If you have any questions after today's visit, please call [phone].

## 2018-10-04 NOTE — PROGRESS NOTES
Chief Complaint Patient presents with  Pre-op Exam  
  Patient states that he is scheduled for cataract surgery on 10/9/18. 1. Have you been to the ER, urgent care clinic since your last visit? Hospitalized since your last visit? No 
 
2. Have you seen or consulted any other health care providers outside of the [address] [place] [address] since your last visit? Include any pap smears or colon screening.  No

## 2018-10-04 NOTE — PROGRESS NOTES
[address] [facility] and Primary Care 
[place] [address] 
[address] [place] [address] [address] Phone:  [phone]  Fax: [phone] Chief Complaint Patient presents with  Pre-op Exam  
  Patient states that he is scheduled for cataract surgery on 10/9/18. .   
 
SUBJECTIVE: 
  Mi Ace is a 80 y.o. male comes in stating that he is going to have cataract surgery in the near future. He will be operated on sequentially right eye then left. There is a significant reduction in his visual acuity. He was playing pickup basketball and was kneed in his right thigh and now has pain in that area as well as the knee. From a diabetic perspective, he is taking 12 units of premixed ac breakfast and 15 units ac dinner. Average fasting sugars are in the 150-160 range but this often times occurs when he eats his dinner late in the evening. He has had no interventional hypoglycemia. He has a past history of primary hypertension and dyslipidemia as well as hypothyroidism. Current Outpatient Prescriptions Medication Sig Dispense Refill  metFORMIN (GLUCOPHAGE) 1,000 mg tablet Take 1,000 mg by mouth two (2) times daily (with meals).  blood-glucose meter (FREESTYLE LITE METER) by Does Not Apply route.  blood sugar diagnostic (FREESTYLE LITE STRIPS) by In Vitro route.  co-enzyme Q-10 (COQ-10) 100 mg capsule Take 100 mg by mouth daily.  lisinopril (PRINIVIL, ZESTRIL) 20 mg tablet Take 20 mg by mouth daily.  aspirin (AGATHA CHEWABLE ASPIRIN) 81 mg chewable tablet Take 81 mg by mouth daily.  tamsulosin (FLOMAX) 0.4 mg capsule Take 0.4 mg by mouth nightly.  simvastatin (ZOCOR) 20 mg tablet Take 20 mg by mouth nightly.  levothyroxine (SYNTHROID) 100 mcg tablet Take 100 mcg by mouth Daily (before breakfast).  multivit,tx with iron,minerals (COMPLETE MULTIVITAMIN PO) Take 1 Tab by mouth daily.  insulin NPH/insulin regular (HUMULIN 70/30 U-100 INSULIN) 100 unit/mL (70-30) injection 15 Units by SubCUTAneous route Before breakfast and dinner. Past Medical History:  
Diagnosis Date  Diabetes ([address]  Hypertension Past Surgical History:  
Procedure Laterality Date  ABDOMEN SURGERY PROC UNLISTED    
 hemmoroidectomy  HX COLONOSCOPY  2015 Joe----unremarkable  HX HEENT    
 laser for retinopathy  HX ORTHOPAEDIC    
 achilles tendon surgery  HX REFRACTIVE SURGERY No Known Allergies REVIEW OF SYSTEMS: 
General: negative for - chills or fever ENT: negative for - headaches, nasal congestion or tinnitus Respiratory: negative for - cough, hemoptysis, shortness of breath or wheezing Cardiovascular : negative for - chest pain, edema, palpitations or shortness of breath Gastrointestinal: negative for - abdominal pain, blood in stools, heartburn or nausea/vomiting Genito-Urinary: no dysuria, trouble voiding, or hematuria Musculoskeletal: negative for - gait disturbance, joint pain, joint stiffness or joint swelling Neurological: no TIA or stroke symptoms Hematologic: no bruises, no bleeding, no swollen glands Integument: no lumps, mole changes, nail changes or rash Endocrine: no malaise/lethargy or unexpected weight changes Social History Social History  Marital status: UNKNOWN Spouse name: N/A  
 Number of children: 4  
 Years of education: N/A Occupational History  retired [employer]  retired [email or web address] after 5 years Social History Main Topics  Smoking status: Former Smoker  Smokeless tobacco: Never Used  Alcohol use 0.6 oz/week 1 Glasses of wine per week  Drug use: No  
 Sexual activity: Yes Other Topics Concern  None Social History Narrative Family History Problem Relation Age of Onset  Heart Disease Mother OBJECTIVE: 
 
Visit Vitals  /67  Pulse 70  Temp 97.6 °F (36.4 °C) (Oral)  Resp 16  
 Ht 5' 10\" (1.778 m)  Wt 214 lb 11.2 oz (97.4 kg)  SpO2 98%  BMI 30.81 kg/m2 CONSTITUTIONAL: well , well nourished, appears age appropriate EYES: perrla, eom intact ENMT:moist mucous membranes, pharynx clear NECK: supple. Thyroid normal 
RESPIRATORY: Chest: clear to ascultation and percussion CARDIOVASCULAR: Heart: regular rate and rhythm GASTROINTESTINAL: Abdomen: soft, bowel sounds active HEMATOLOGIC: no pathological lymph nodes palpated MUSCULOSKELETAL: Extremities: no edema, pulse 1+ INTEGUMENT: No unusual rashes or suspicious skin lesions noted. Nails appear normal. 
NEUROLOGIC: non-focal exam  
MENTAL STATUS: alert and oriented, appropriate affect ASSESSMENT: 
1. Preoperative examination 2. 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 Essential hypertension 4. Dyslipidemia 5. Acquired hypothyroidism PLAN: 
 
1. The patient's form will be completed for his eye surgery 2. From a diabetic perspective I suggest increasing his insulin to 15 units ac breakfast and dinner. The biggest problem he has is eating at the same time every day, which he has a difficult time with. He is also reminded to minimize his consumption of processed carbs. 3. He has an effusion of his right knee with accompanying pain with flexion and hyperextension. This is traumatically induced. I suggest he use Naproxen 500 mg b.i.d. for the next two weeks. 4. He will continue his antihypertensive medication as prescribed. 5. He will also continue statin. His last Apo-b level was excellent. Follow-up Disposition: 
Return keep old apt.  
 
 
Reyes Elizabeth MD

## 2018-10-28 PROBLEM — E66.09 CLASS 1 OBESITY DUE TO EXCESS CALORIES WITH SERIOUS COMORBIDITY AND BODY MASS INDEX (BMI) OF 30.0 TO 30.9 IN ADULT: Status: ACTIVE | Noted: 2018-10-28

## 2018-10-28 NOTE — PROGRESS NOTES
[address] [place] [address] and Primary Care 
[place] [address] 
[address] [place] [address] [address] Phone:  [phone]  Fax: [phone] Chief Complaint Patient presents with [address] [place] [address] Annual Wellness Visit Tano Duran SUBJECTIVE: 
  Oscar Gilman is a 80 y.o. male comes in for return visit for follow-up of his diabetes. He has not had any interventional hypoglycemia. He remains on his premixed insulin twice a day. I again reminded him of the importance of eating at the same time every day to minimize hypoglycemia and glycemic variability. He definitely needs to lose weight. This is aggravating his diabetes. We talked about this at length. He has a past history of primary hypertension dyslipidemia and hypothyroidism. He is compliant with all appropriate medications for these conditions. He is also reasonably active. Current Outpatient Medications Medication Sig Dispense Refill  metFORMIN (GLUCOPHAGE) 1,000 mg tablet Take 1,000 mg by mouth two (2) times daily (with meals).  blood-glucose meter (FREESTYLE LITE METER) by Does Not Apply route.  blood sugar diagnostic (FREESTYLE LITE STRIPS) by In Vitro route.  co-enzyme Q-10 (COQ-10) 100 mg capsule Take 100 mg by mouth daily.  lisinopril (PRINIVIL, ZESTRIL) 20 mg tablet Take 20 mg by mouth daily.  aspirin (AGATHA CHEWABLE ASPIRIN) 81 mg chewable tablet Take 81 mg by mouth daily.  tamsulosin (FLOMAX) 0.4 mg capsule Take 0.4 mg by mouth nightly.  simvastatin (ZOCOR) 20 mg tablet Take 20 mg by mouth nightly.  levothyroxine (SYNTHROID) 100 mcg tablet Take 100 mcg by mouth Daily (before breakfast).  multivit,tx with iron,minerals (COMPLETE MULTIVITAMIN PO) Take 1 Tab by mouth daily.  insulin NPH/insulin regular (HUMULIN 70/30 U-100 INSULIN) 100 unit/mL (70-30) injection 15 Units by SubCUTAneous route Before breakfast and dinner. Past Medical History:  
Diagnosis Date  Diabetes ([facility]  Hypertension Past Surgical History:  
Procedure Laterality Date  ABDOMEN SURGERY PROC UNLISTED    
 hemmoroidectomy  HX COLONOSCOPY  2015 Joe----unremarkable  HX HEENT    
 laser for retinopathy  HX ORTHOPAEDIC    
 achilles tendon surgery  HX REFRACTIVE SURGERY No Known Allergies REVIEW OF SYSTEMS: 
General: negative for - chills or fever ENT: negative for - headaches, nasal congestion or tinnitus Respiratory: negative for - cough, hemoptysis, shortness of breath or wheezing Cardiovascular : negative for - chest pain, edema, palpitations or shortness of breath Gastrointestinal: negative for - abdominal pain, blood in stools, heartburn or nausea/vomiting Genito-Urinary: no dysuria, trouble voiding, or hematuria Musculoskeletal: negative for - gait disturbance, joint pain, joint stiffness or joint swelling Neurological: no TIA or stroke symptoms Hematologic: no bruises, no bleeding, no swollen glands Integument: no lumps, mole changes, nail changes or rash Endocrine: no malaise/lethargy or unexpected weight changes Social History Socioeconomic History  Marital status: UNKNOWN Spouse name: Not on file  Number of children: 4  
 Years of education: Not on file  Highest education level: Not on file Social Needs  Financial resource strain: Not on file  Food insecurity - worry: Not on file  Food insecurity - inability: Not on file  Transportation needs - medical: Not on file  Transportation needs - non-medical: Not on file Occupational History  Occupation: retired [employer]  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 Tobacco Use  Smoking status: Former Smoker  Smokeless tobacco: Never Used Substance and Sexual Activity  Alcohol use: Yes Alcohol/week: 0.6 oz Types: 1 Glasses of wine per week  Drug use: No  
 Sexual activity: Yes Other Topics Concern  Not on file Social History Narrative  Not on file Family History Problem Relation Age of Onset  Heart Disease Mother OBJECTIVE: 
 
Visit Vitals /70 Pulse 63 Temp 98 °F (36.7 °C) (Oral) Resp 16 Ht 5' 10\" (1.778 m) Wt 213 lb 8 oz (96.8 kg) SpO2 96% BMI 30.63 kg/m² CONSTITUTIONAL: well , well nourished, appears age appropriate EYES: perrla, eom intact ENMT:moist mucous membranes, pharynx clear NECK: supple. Thyroid normal 
RESPIRATORY: Chest: clear to ascultation and percussion CARDIOVASCULAR: Heart: regular rate and rhythm GASTROINTESTINAL: Abdomen: soft, bowel sounds active HEMATOLOGIC: no pathological lymph nodes palpated MUSCULOSKELETAL: Extremities: no edema, pulse 1+ INTEGUMENT: No unusual rashes or suspicious skin lesions noted. Nails appear normal. 
NEUROLOGIC: non-focal exam  
MENTAL STATUS: alert and oriented, appropriate affect ASSESSMENT: 
1. 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 Essential hypertension 3. Dyslipidemia 4. Class 1 obesity due to excess calories with serious comorbidity and body mass index (BMI) of 30.0 to 30.9 in adult 5. Encounter for immunization 6. Screening for alcoholism 7. Screening for depression 8. Wellness examination PLAN: 
1. From a diabetic perspective his diabetes appears to be doing well. No adjustments are made. I remind him the importance of eating at the same time every day. 2.  Blood pressure is excellent no adjustments are made. 3.  He will continue statin as prescribed in view of his increased cardiovascular risk. 4.  I remind him of the importance of weight reduction. This can be accomplished by eating meals which is what he should be doing anyway from a diabetic perspective, eliminating snacks except his at bedtime snack and avoiding processed carbohydrates. Kal Benjamin Orders Placed This Encounter  DE IMMUNIZ ADMIN,1 SINGLE/COMB VAC/TOXOID  Depression Screen Annual  
 Influenza virus vaccine (FLUZONE HIGH DOSE) 65 years and older ([address])  AMB POC GLUCOSE BLOOD, BY GLUCOSE MONITORING DEVICE Follow-up Disposition: 
Return in about 4 weeks (around 10/9/2018). Quinn Reveles MD 
This is the Subsequent Medicare Annual Wellness Exam, performed 12 months or more after the Initial AWV or the last Subsequent AWV I have reviewed the patient's medical history in detail and updated the computerized patient record. History Past Medical History:  
Diagnosis Date  Diabetes ([address]  Hypertension Past Surgical History:  
Procedure Laterality Date  ABDOMEN SURGERY PROC UNLISTED    
 hemmoroidectomy  HX COLONOSCOPY  2015 Joe----unremarkable  HX HEENT    
 laser for retinopathy  HX ORTHOPAEDIC    
 achilles tendon surgery  HX REFRACTIVE SURGERY Current Outpatient Medications Medication Sig Dispense Refill  metFORMIN (GLUCOPHAGE) 1,000 mg tablet Take 1,000 mg by mouth two (2) times daily (with meals).  blood-glucose meter (FREESTYLE LITE METER) by Does Not Apply route.  blood sugar diagnostic (FREESTYLE LITE STRIPS) by In Vitro route.  co-enzyme Q-10 (COQ-10) 100 mg capsule Take 100 mg by mouth daily.  lisinopril (PRINIVIL, ZESTRIL) 20 mg tablet Take 20 mg by mouth daily.  aspirin (AGATHA CHEWABLE ASPIRIN) 81 mg chewable tablet Take 81 mg by mouth daily.  tamsulosin (FLOMAX) 0.4 mg capsule Take 0.4 mg by mouth nightly.  simvastatin (ZOCOR) 20 mg tablet Take 20 mg by mouth nightly.  levothyroxine (SYNTHROID) 100 mcg tablet Take 100 mcg by mouth Daily (before breakfast).  multivit,tx with iron,minerals (COMPLETE MULTIVITAMIN PO) Take 1 Tab by mouth daily.     
 insulin NPH/insulin regular (HUMULIN 70/30 U-100 INSULIN) 100 unit/mL (70-30) injection 15 Units by SubCUTAneous route Before breakfast and dinner. No Known Allergies Family History Problem Relation Age of Onset  Heart Disease Mother Social History Tobacco Use  Smoking status: Former Smoker  Smokeless tobacco: Never Used Substance Use Topics  Alcohol use: Yes Alcohol/week: 0.6 oz Types: 1 Glasses of wine per week Patient Active Problem List  
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, with long-term current use of insulin ([facility] F45.7189, Z79.4  Essential hypertension I10  Dyslipidemia E78.5  Benign prostatic hyperplasia without lower urinary tract symptoms N40.0  Acquired hypothyroidism E03.9  Class 1 obesity due to excess calories with serious comorbidity and body mass index (BMI) of 30.0 to 30.9 in adult E66.09, Z68.30 Depression Risk Factor Screening: PHQ over the last two weeks 10/4/2018 Little interest or pleasure in doing things Not at all Feeling down, depressed, irritable, or hopeless Not at all Total Score PHQ 2 0 Alcohol Risk Factor Screening: You do not drink alcohol or very rarely. Functional Ability and Level of Safety:  
Hearing Loss Hearing is good. Activities of Daily Living The home contains: no safety equipment. Patient does total self care Fall Risk Fall Risk Assessment, last 12 mths 10/4/2018 Able to walk? Yes Fall in past 12 months? No  
 
 
Abuse Screen Patient is not abused Cognitive Screening Evaluation of Cognitive Function: 
Has your family/caregiver stated any concerns about your memory: no 
Normal 
 
Patient Care Team  
Patient Care Team: 
Elma Sewell MD as PCP - General (Internal Medicine) Assessment/Plan Education and counseling provided: 
Are appropriate based on today's review and evaluation Diagnoses and all orders for this visit: 
 
 1. 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 Essential hypertension 3. Dyslipidemia 4. Class 1 obesity due to excess calories with serious comorbidity and body mass index (BMI) of 30.0 to 30.9 in adult 5. Encounter for immunization 
-     INFLUENZA VIRUS VACCINE, HIGH DOSE SEASONAL, PRESERVATIVE FREE 
-     KS IMMUNIZ ADMIN,1 SINGLE/COMB VAC/TOXOID 6. Screening for alcoholism -     KS ANNUAL ALCOHOL SCREEN 15 MIN 
 
7. Screening for depression 
-     [place] [id number] 8. Wellness examination Health Maintenance Due Topic Date Due  
 Hepatitis C Screening  1950  
 FOOT EXAM Q1  04/30/1961  
 EYE EXAM RETINAL OR DILATED Q1  04/30/1961  GLAUCOMA SCREENING Q2Y  04/30/2016

## 2018-11-06 RX ORDER — NAPROXEN SODIUM 220 MG
TABLET ORAL
Qty: 100 SYRINGE | Refills: 11 | Status: SHIPPED | OUTPATIENT
Start: 2018-11-06 | End: 2019-11-26 | Stop reason: SDUPTHER

## 2018-11-06 RX ORDER — TAMSULOSIN HYDROCHLORIDE 0.4 MG/1
0.4 CAPSULE ORAL
Qty: 90 CAP | Refills: 3 | Status: SHIPPED | OUTPATIENT
Start: 2018-11-06 | End: 2019-06-18 | Stop reason: CLARIF

## 2018-12-11 ENCOUNTER — OFFICE VISIT (OUTPATIENT)
Dept: INTERNAL MEDICINE CLINIC | Age: 68
End: 2018-12-11

## 2018-12-11 VITALS
SYSTOLIC BLOOD PRESSURE: 134 MMHG | HEIGHT: 70 IN | WEIGHT: 219.7 LBS | HEART RATE: 65 BPM | OXYGEN SATURATION: 98 % | BODY MASS INDEX: 31.45 KG/M2 | DIASTOLIC BLOOD PRESSURE: 78 MMHG | RESPIRATION RATE: 18 BRPM | TEMPERATURE: 97.5 F

## 2018-12-11 DIAGNOSIS — E11.3393 CONTROLLED TYPE 2 DIABETES MELLITUS WITH BOTH EYES AFFECTED BY MODERATE NONPROLIFERATIVE RETINOPATHY WITHOUT MACULAR EDEMA, WITH LONG-TERM CURRENT USE OF INSULIN (HCC): Primary | ICD-10-CM

## 2018-12-11 DIAGNOSIS — N40.0 BENIGN PROSTATIC HYPERPLASIA WITHOUT LOWER URINARY TRACT SYMPTOMS: ICD-10-CM

## 2018-12-11 DIAGNOSIS — E78.5 DYSLIPIDEMIA: ICD-10-CM

## 2018-12-11 DIAGNOSIS — Z79.4 CONTROLLED TYPE 2 DIABETES MELLITUS WITH BOTH EYES AFFECTED BY MODERATE NONPROLIFERATIVE RETINOPATHY WITHOUT MACULAR EDEMA, WITH LONG-TERM CURRENT USE OF INSULIN (HCC): Primary | ICD-10-CM

## 2018-12-11 DIAGNOSIS — I10 ESSENTIAL HYPERTENSION: ICD-10-CM

## 2018-12-11 DIAGNOSIS — E03.9 ACQUIRED HYPOTHYROIDISM: ICD-10-CM

## 2018-12-11 DIAGNOSIS — R09.89 BRUIT OF RIGHT CAROTID ARTERY: ICD-10-CM

## 2018-12-11 NOTE — PROGRESS NOTES
Chief Complaint   Patient presents with    Diabetes     3 month follow up      1. Have you been to the ER, urgent care clinic since your last visit? Hospitalized since your last visit? No    2. Have you seen or consulted any other health care providers outside of the [address] [place] [address] since your last visit? Include any pap smears or colon screening.  No

## 2018-12-11 NOTE — PROGRESS NOTES
[address] [facility]  [place] [address]  [address] [place] [address]  Phone:  [phone]  Fax: [phone]       Chief Complaint   Patient presents with    Diabetes     3 month follow up    . SUBJECTIVE:    Diya Knight is a 80 y.o. male who comes in for a return visit stating that he has done well. He is not taking his Flomax currently and has not noticed any recurrent  symptoms since being off. He is a diabetic and has been taking his premixed insulin at 15 units twice a day a.c. breakfast and dinner. He is only taking Metformin 1000 mg daily, although he should be on a b.i.d. basis. He admits to dietary indiscretion. He has a past history of primary hypertension, dyslipidemia and hypothyroidism. Current Outpatient Medications   Medication Sig Dispense Refill    Insulin Syringe-Needle U-100 0.5 mL 31 gauge x 5/16 syrg Use to inject insulin twice a day. Dx.e11.9 100 Syringe 11    metFORMIN (GLUCOPHAGE) 1,000 mg tablet Take 1,000 mg by mouth two (2) times daily (with meals).  blood-glucose meter (FREESTYLE LITE METER) by Does Not Apply route.  blood sugar diagnostic (FREESTYLE LITE STRIPS) by In Vitro route.  co-enzyme Q-10 (COQ-10) 100 mg capsule Take 100 mg by mouth daily.  lisinopril (PRINIVIL, ZESTRIL) 20 mg tablet Take 20 mg by mouth daily.  aspirin (AGATHA CHEWABLE ASPIRIN) 81 mg chewable tablet Take 81 mg by mouth daily.  simvastatin (ZOCOR) 20 mg tablet Take 20 mg by mouth nightly.  levothyroxine (SYNTHROID) 100 mcg tablet Take 100 mcg by mouth Daily (before breakfast).  multivit,tx with iron,minerals (COMPLETE MULTIVITAMIN PO) Take 1 Tab by mouth daily.  insulin NPH/insulin regular (HUMULIN 70/30 U-100 INSULIN) 100 unit/mL (70-30) injection 15 Units by SubCUTAneous route Before breakfast and dinner.  tamsulosin (FLOMAX) 0.4 mg capsule Take 1 Cap by mouth nightly.  90 Cap 3     Past Medical History:   Diagnosis Date    Diabetes ([facility]     Hypertension      Past Surgical History:   Procedure Laterality Date    ABDOMEN SURGERY PROC UNLISTED      hemmoroidectomy    HX COLONOSCOPY  2015    Joe----unremarkable    HX HEENT      laser for retinopathy    HX ORTHOPAEDIC      achilles tendon surgery    HX REFRACTIVE SURGERY       No Known Allergies      REVIEW OF SYSTEMS:  General: negative for - chills or fever  ENT: negative for - headaches, nasal congestion or tinnitus  Respiratory: negative for - cough, hemoptysis, shortness of breath or wheezing  Cardiovascular : negative for - chest pain, edema, palpitations or shortness of breath  Gastrointestinal: negative for - abdominal pain, blood in stools, heartburn or nausea/vomiting  Genito-Urinary: no dysuria, trouble voiding, or hematuria  Musculoskeletal: negative for - gait disturbance, joint pain, joint stiffness or joint swelling  Neurological: no TIA or stroke symptoms  Hematologic: no bruises, no bleeding, no swollen glands  Integument: no lumps, mole changes, nail changes or rash  Endocrine: no malaise/lethargy or unexpected weight changes      Social History     Socioeconomic History    Marital status: UNKNOWN     Spouse name: Not on file    Number of children: 4    Years of education: Not on file    Highest education level: Not on file   Occupational History    Occupation: [employer] Occupation: retired [employer] after 5 years   Tobacco Use    Smoking status: Former Smoker    Smokeless tobacco: Never Used   Substance and Sexual Activity    Alcohol use:  Yes     Alcohol/week: 0.6 oz     Types: 1 Glasses of wine per week    Drug use: No    Sexual activity: Yes     Family History   Problem Relation Age of Onset    Heart Disease Mother        OBJECTIVE:    Visit Vitals  /78   Pulse 65   Temp 97.5 °F (36.4 °C) (Oral)   Resp 18   Ht 5' 10\" (1.778 m)   Wt 219 lb 11.2 oz (99.7 kg)   SpO2 98%   BMI 31.52 kg/m² CONSTITUTIONAL: well , well nourished, appears age appropriate  EYES: perrla, eom intact  ENMT:moist mucous membranes, pharynx clear  NECK: supple. Thyroid normal, right carotid bruit  RESPIRATORY: Chest: clear to ascultation and percussion   CARDIOVASCULAR: Heart: regular rate and rhythm  GASTROINTESTINAL: Abdomen: soft, bowel sounds active  HEMATOLOGIC: no pathological lymph nodes palpated  MUSCULOSKELETAL: Extremities: no edema, pulse 1+   INTEGUMENT: No unusual rashes or suspicious skin lesions noted. Nails appear normal.  NEUROLOGIC: non-focal exam   MENTAL STATUS: alert and oriented, appropriate affect      ASSESSMENT:  1. 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 Essential hypertension    3. Benign prostatic hyperplasia without lower urinary tract symptoms    4. Dyslipidemia    5. Acquired hypothyroidism    6. Bruit of right carotid artery        PLAN:    1. The patient's diabetes is not doing as well as he should be. I suggest taking the Metformin 1000 mg twice a day with breakfast and dinner. I also encourage him to reduce his consumption of processed carbohydrates, which is creating problems with his elevated blood sugars and poor control. He is to continue to eat at the same time every day as he is doing currently. 2. Blood pressure is excellent and no adjustments are made. 3. As far as his prostatism is concerned, he will not resume the Flomax for now, but only if his symptoms return. 4. He will continue his statin as prescribed and efficacy and compliance will be assessed. 5. I will also assess his TSH to ensure compliance and efficacy. 6. I also note a right carotid bruit today and carotid Dopplers will therefore be obtained. I explained this to the patient and he agrees.           .  Orders Placed This Encounter    DUPLEX CAROTID BILATERAL    HEMOGLOBIN A1C WITH EAG    APOLIPOPROTEIN B    TSH 3RD GENERATION         Follow-up Disposition:  Return in about 3 months (around 3/11/2019).       Melissa Griffin MD

## 2018-12-12 LAB
APO B SERPL-MCNC: 94 MG/DL (ref 52–135)
EST. AVERAGE GLUCOSE BLD GHB EST-MCNC: 171 MG/DL
HBA1C MFR BLD: 7.6 % (ref 4.8–5.6)
TSH SERPL DL<=0.005 MIU/L-ACNC: 2.39 UIU/ML (ref 0.45–4.5)

## 2018-12-26 DIAGNOSIS — E78.5 DYSLIPIDEMIA: Primary | ICD-10-CM

## 2018-12-26 RX ORDER — SIMVASTATIN 40 MG/1
40 TABLET, FILM COATED ORAL
Qty: 30 TAB | Refills: 11 | Status: SHIPPED | OUTPATIENT
Start: 2018-12-26 | End: 2019-10-21 | Stop reason: SDUPTHER

## 2019-03-12 ENCOUNTER — OFFICE VISIT (OUTPATIENT)
Dept: INTERNAL MEDICINE CLINIC | Age: 69
End: 2019-03-12

## 2019-03-12 VITALS
RESPIRATION RATE: 16 BRPM | HEART RATE: 64 BPM | HEIGHT: 70 IN | WEIGHT: 220.3 LBS | OXYGEN SATURATION: 97 % | BODY MASS INDEX: 31.54 KG/M2 | TEMPERATURE: 97.6 F | SYSTOLIC BLOOD PRESSURE: 135 MMHG | DIASTOLIC BLOOD PRESSURE: 78 MMHG

## 2019-03-12 DIAGNOSIS — Z11.59 NEED FOR HEPATITIS C SCREENING TEST: ICD-10-CM

## 2019-03-12 DIAGNOSIS — E78.5 DYSLIPIDEMIA: ICD-10-CM

## 2019-03-12 DIAGNOSIS — M19.90 INFLAMMATORY OSTEOARTHRITIS: ICD-10-CM

## 2019-03-12 DIAGNOSIS — Z79.4 CONTROLLED TYPE 2 DIABETES MELLITUS WITH BOTH EYES AFFECTED BY MODERATE NONPROLIFERATIVE RETINOPATHY WITHOUT MACULAR EDEMA, WITH LONG-TERM CURRENT USE OF INSULIN (HCC): Primary | ICD-10-CM

## 2019-03-12 DIAGNOSIS — E11.3393 CONTROLLED TYPE 2 DIABETES MELLITUS WITH BOTH EYES AFFECTED BY MODERATE NONPROLIFERATIVE RETINOPATHY WITHOUT MACULAR EDEMA, WITH LONG-TERM CURRENT USE OF INSULIN (HCC): Primary | ICD-10-CM

## 2019-03-12 DIAGNOSIS — I10 ESSENTIAL HYPERTENSION: ICD-10-CM

## 2019-03-12 DIAGNOSIS — E11.3393 MODERATE NONPROLIFERATIVE DIABETIC RETINOPATHY OF BOTH EYES ASSOCIATED WITH TYPE 2 DIABETES MELLITUS, MACULAR EDEMA PRESENCE UNSPECIFIED (HCC): ICD-10-CM

## 2019-03-12 NOTE — PROGRESS NOTES
Chief Complaint   Patient presents with    Diabetes     3 month follow up      1. Have you been to the ER, urgent care clinic since your last visit? Hospitalized since your last visit? No    2. Have you seen or consulted any other health care providers outside of the [address] [place] [address] since your last visit? Include any pap smears or colon screening.  No

## 2019-03-12 NOTE — PROGRESS NOTES
[address] [facility]  [place] [address]  [address] [place] [address]  Phone:  [phone]  Fax: [phone]       Chief Complaint   Patient presents with    Diabetes     3 month follow up    . SUBJECTIVE:    Di Moreno is a 80 y.o. male Comes in for return visit stating that he has done well. From a diabetic perspective he remains on his current dose of premixed insulin 15 units ac breakfast and dinner and Metformin 1,000 mg b.i.d. He admits to dietary indiscretion. Average fasting sugar is in the 150-180 range. He has not had any interventional hypoglycemia. He has diabetic retinopathy, but has not required any laser treatments for 10+ years. A cataract was most recently removed. He has pain in his hands and is unable to make a fist in either hand. I felt this was related to inflammatory osteoarthritis. He has a past history of primary hypertension and dyslipidemia, as well as obesity. Current Outpatient Medications   Medication Sig Dispense Refill    simvastatin (ZOCOR) 40 mg tablet Take 1 Tab by mouth nightly. 30 Tab 11    Insulin Syringe-Needle U-100 0.5 mL 31 gauge x 5/16 syrg Use to inject insulin twice a day. Dx.e11.9 100 Syringe 11    metFORMIN (GLUCOPHAGE) 1,000 mg tablet Take 1,000 mg by mouth two (2) times daily (with meals).  blood-glucose meter (FREESTYLE LITE METER) by Does Not Apply route.  blood sugar diagnostic (FREESTYLE LITE STRIPS) by In Vitro route.  co-enzyme Q-10 (COQ-10) 100 mg capsule Take 100 mg by mouth daily.  lisinopril (PRINIVIL, ZESTRIL) 20 mg tablet Take 20 mg by mouth daily.  aspirin (AGATHA CHEWABLE ASPIRIN) 81 mg chewable tablet Take 81 mg by mouth daily.  levothyroxine (SYNTHROID) 100 mcg tablet Take 100 mcg by mouth Daily (before breakfast).  multivit,tx with iron,minerals (COMPLETE MULTIVITAMIN PO) Take 1 Tab by mouth daily.       insulin NPH/insulin regular (HUMULIN 70/30 U-100 INSULIN) 100 unit/mL (70-30) injection 15 Units by SubCUTAneous route Before breakfast and dinner.  tamsulosin (FLOMAX) 0.4 mg capsule Take 1 Cap by mouth nightly. 80 Cap 3     Past Medical History:   Diagnosis Date    Diabetes ([address]     Hypertension      Past Surgical History:   Procedure Laterality Date    ABDOMEN SURGERY PROC UNLISTED      hemmoroidectomy    HX COLONOSCOPY  2015    Joe----unremarkable    HX HEENT      laser for retinopathy    HX ORTHOPAEDIC      achilles tendon surgery    HX REFRACTIVE SURGERY       No Known Allergies      REVIEW OF SYSTEMS:  General: negative for - chills or fever  ENT: negative for - headaches, nasal congestion or tinnitus  Respiratory: negative for - cough, hemoptysis, shortness of breath or wheezing  Cardiovascular : negative for - chest pain, edema, palpitations or shortness of breath  Gastrointestinal: negative for - abdominal pain, blood in stools, heartburn or nausea/vomiting  Genito-Urinary: no dysuria, trouble voiding, or hematuria  Musculoskeletal: negative for - gait disturbance, joint pain, joint stiffness or joint swelling  Neurological: no TIA or stroke symptoms  Hematologic: no bruises, no bleeding, no swollen glands  Integument: no lumps, mole changes, nail changes or rash  Endocrine: no malaise/lethargy or unexpected weight changes      Social History     Socioeconomic History    Marital status: UNKNOWN     Spouse name: Not on file    Number of children: 4    Years of education: Not on file    Highest education level: Not on file   Occupational History    Occupation: retired fro [employer] 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   Tobacco Use    Smoking status: Former Smoker    Smokeless tobacco: Never Used   Substance and Sexual Activity    Alcohol use:  Yes     Alcohol/week: 0.6 oz     Types: 1 Glasses of wine per week    Drug use: No    Sexual activity: Yes     Family History   Problem Relation Age of Onset    Heart Disease Mother        OBJECTIVE:    Visit Vitals  /78   Pulse 64   Temp 97.6 °F (36.4 °C) (Oral)   Resp 16   Ht 5' 10\" (1.778 m)   Wt 220 lb 4.8 oz (99.9 kg)   SpO2 97%   BMI 31.61 kg/m²     CONSTITUTIONAL: well , well nourished, appears age appropriate  EYES: perrla, eom intact  ENMT:moist mucous membranes, pharynx clear  NECK: supple. Thyroid normal  RESPIRATORY: Chest: clear to ascultation and percussion   CARDIOVASCULAR: Heart: regular rate and rhythm  GASTROINTESTINAL: Abdomen: soft, bowel sounds active  HEMATOLOGIC: no pathological lymph nodes palpated  MUSCULOSKELETAL: Extremities: no edema, pulse 1+   INTEGUMENT: No unusual rashes or suspicious skin lesions noted. Nails appear normal.  NEUROLOGIC: non-focal exam   MENTAL STATUS: alert and oriented, appropriate affect      ASSESSMENT:  1. 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 Moderate nonproliferative diabetic retinopathy of both eyes associated with type 2 diabetes mellitus, macular edema presence unspecified ([address]    3. Essential hypertension    4. Dyslipidemia    5. Inflammatory osteoarthritis    6. Need for hepatitis C screening test        PLAN:    1. I contemplated giving him low dose prednisone for his inflammatory osteoarthritis. It would be quite effective, however I am somewhat concerned about his diabetes. Before I do this, I will await the results of his hemoglobin A1c and make a decision after that. 2. From a diabetic perspective, hemoglobin A1c will be checked. We did talk about diet and the importance of eating at the same time every day. I emphasized the importance of eliminating the consumption of processed carbohydrates from his diet, specifically avoiding sweets, white breads and white potatoes. 3. His blood pressure is excellent today, no adjustments were made.   4. He will continue statin as prescribed. His last Apo-B level was reasonable, although not optimal.  5. I emphasized the importance of remaining physically active on a consistent basis. 6. I contemplated giving him Fenofibrate for his diabetic retinopathy. I would like to get a report from his ophthalmologist first, however, particularly in view of the fact that he is on a statin. Follow-up Disposition:  Return in about 3 months (around 6/12/2019).       Pino Urbano MD

## 2019-03-13 LAB
APO B SERPL-MCNC: 96 MG/DL
EST. AVERAGE GLUCOSE BLD GHB EST-MCNC: 212 MG/DL
HBA1C MFR BLD: 9 % (ref 4.8–5.6)
HCV AB S/CO SERPL IA: <0.1 S/CO RATIO (ref 0–0.9)

## 2019-06-03 RX ORDER — LEVOTHYROXINE SODIUM 100 UG/1
100 TABLET ORAL
Qty: 90 TAB | Refills: 3 | Status: SHIPPED | OUTPATIENT
Start: 2019-06-03 | End: 2020-05-02

## 2019-06-18 ENCOUNTER — OFFICE VISIT (OUTPATIENT)
Dept: INTERNAL MEDICINE CLINIC | Age: 69
End: 2019-06-18

## 2019-06-18 VITALS
DIASTOLIC BLOOD PRESSURE: 70 MMHG | OXYGEN SATURATION: 98 % | HEART RATE: 63 BPM | HEIGHT: 70 IN | WEIGHT: 219.5 LBS | SYSTOLIC BLOOD PRESSURE: 126 MMHG | TEMPERATURE: 97.8 F | RESPIRATION RATE: 18 BRPM | BODY MASS INDEX: 31.42 KG/M2

## 2019-06-18 DIAGNOSIS — E66.09 CLASS 1 OBESITY DUE TO EXCESS CALORIES WITH SERIOUS COMORBIDITY AND BODY MASS INDEX (BMI) OF 30.0 TO 30.9 IN ADULT: ICD-10-CM

## 2019-06-18 DIAGNOSIS — E11.3393 CONTROLLED TYPE 2 DIABETES MELLITUS WITH BOTH EYES AFFECTED BY MODERATE NONPROLIFERATIVE RETINOPATHY WITHOUT MACULAR EDEMA, WITH LONG-TERM CURRENT USE OF INSULIN (HCC): Primary | ICD-10-CM

## 2019-06-18 DIAGNOSIS — I10 ESSENTIAL HYPERTENSION: ICD-10-CM

## 2019-06-18 DIAGNOSIS — E03.9 ACQUIRED HYPOTHYROIDISM: ICD-10-CM

## 2019-06-18 DIAGNOSIS — R09.89 BRUIT OF RIGHT CAROTID ARTERY: ICD-10-CM

## 2019-06-18 DIAGNOSIS — Z79.4 CONTROLLED TYPE 2 DIABETES MELLITUS WITH BOTH EYES AFFECTED BY MODERATE NONPROLIFERATIVE RETINOPATHY WITHOUT MACULAR EDEMA, WITH LONG-TERM CURRENT USE OF INSULIN (HCC): Primary | ICD-10-CM

## 2019-06-18 DIAGNOSIS — E78.5 DYSLIPIDEMIA: ICD-10-CM

## 2019-06-18 DIAGNOSIS — N40.0 BENIGN PROSTATIC HYPERPLASIA WITHOUT LOWER URINARY TRACT SYMPTOMS: ICD-10-CM

## 2019-06-18 DIAGNOSIS — T83.9XXA: ICD-10-CM

## 2019-06-18 NOTE — PROGRESS NOTES
[address] [facility]  [facility]  [address] [place] [id number]  Phone:  [phone]  Fax: [phone]       Chief Complaint   Patient presents with    Diabetes     3 month follow up    . SUBJECTIVE:    Ingrid Dang is a 77 y.o. male Comes in for return visit stating that he has done reasonably well. He went to [facility], where his Hgb A1c was done and was 6. He is taking his premixed insulin 24 units b.i.d. a.c. He has had no interventional hypoglycemia. He has a past history of primary hypertension, dyslipidemia and hypothyroidism. He remains quite physically active by playing golf on a consistent basis. Current Outpatient Medications   Medication Sig Dispense Refill    levothyroxine (SYNTHROID) 100 mcg tablet Take 1 Tab by mouth Daily (before breakfast). 90 Tab 3    simvastatin (ZOCOR) 40 mg tablet Take 1 Tab by mouth nightly. 30 Tab 11    Insulin Syringe-Needle U-100 0.5 mL 31 gauge x 5/16 syrg Use to inject insulin twice a day. Dx.e11.9 100 Syringe 11    metFORMIN (GLUCOPHAGE) 500 mg tablet Take 500 mg by mouth two (2) times daily (with meals).  blood-glucose meter (FREESTYLE LITE METER) by Does Not Apply route.  blood sugar diagnostic (FREESTYLE LITE STRIPS) by In Vitro route.  co-enzyme Q-10 (COQ-10) 100 mg capsule Take 100 mg by mouth daily.  lisinopril (PRINIVIL, ZESTRIL) 20 mg tablet Take 20 mg by mouth daily.  aspirin (AGATHA CHEWABLE ASPIRIN) 81 mg chewable tablet Take 81 mg by mouth daily.  multivit,tx with iron,minerals (COMPLETE MULTIVITAMIN PO) Take 1 Tab by mouth daily.  insulin NPH/insulin regular (HUMULIN 70/30 U-100 INSULIN) 100 unit/mL (70-30) injection 24 Units by SubCUTAneous route Before breakfast and dinner.        Past Medical History:   Diagnosis Date    Diabetes ([address]     Hypertension      Past Surgical History:   Procedure Laterality Date    ABDOMEN SURGERY PROC UNLISTED      hemmoroidectomy  HX COLONOSCOPY  2015    Joe----unremarkable    HX HEENT      laser for retinopathy    HX ORTHOPAEDIC      achilles tendon surgery    HX REFRACTIVE SURGERY       No Known Allergies      REVIEW OF SYSTEMS:  General: negative for - chills or fever  ENT: negative for - headaches, nasal congestion or tinnitus  Respiratory: negative for - cough, hemoptysis, shortness of breath or wheezing  Cardiovascular : negative for - chest pain, edema, palpitations or shortness of breath  Gastrointestinal: negative for - abdominal pain, blood in stools, heartburn or nausea/vomiting  Genito-Urinary: no dysuria, trouble voiding, or hematuria  Musculoskeletal: negative for - gait disturbance, joint pain, joint stiffness or joint swelling  Neurological: no TIA or stroke symptoms  Hematologic: no bruises, no bleeding, no swollen glands  Integument: no lumps, mole changes, nail changes or rash  Endocrine: no malaise/lethargy or unexpected weight changes      Social History     Socioeconomic History    Marital status: UNKNOWN     Spouse name: Not on file    Number of children: 4    Years of education: Not on file    Highest education level: Not on file   Occupational History    Occupation: retired fro [employer] Occupation: retired [employer] after 5 years   Tobacco Use    Smoking status: Former Smoker    Smokeless tobacco: Never Used   Substance and Sexual Activity    Alcohol use: Yes     Alcohol/week: 0.6 oz     Types: 1 Glasses of wine per week    Drug use: No    Sexual activity: Yes     Family History   Problem Relation Age of Onset    Heart Disease Mother        OBJECTIVE:    Visit Vitals  /70   Pulse 63   Temp 97.8 °F (36.6 °C) (Oral)   Resp 18   Ht 5' 10\" (1.778 m)   Wt 219 lb 8 oz (99.6 kg)   SpO2 98%   BMI 31.49 kg/m²     CONSTITUTIONAL: well , well nourished, appears age appropriate  EYES: perrla, eom intact  ENMT:moist mucous membranes, pharynx clear  NECK: supple.  Thyroid normal  RESPIRATORY: Chest: clear to ascultation and percussion   CARDIOVASCULAR: Heart: regular rate and rhythm  GASTROINTESTINAL: Abdomen: soft, bowel sounds active  HEMATOLOGIC: no pathological lymph nodes palpated  MUSCULOSKELETAL: Extremities: no edema, pulse 1+   INTEGUMENT: No unusual rashes or suspicious skin lesions noted. Nails appear normal.  NEUROLOGIC: non-focal exam   MENTAL STATUS: alert and oriented, appropriate affect      ASSESSMENT:  1. 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 Essential hypertension    3. Acquired hypothyroidism    4. Benign prostatic hyperplasia without lower urinary tract symptoms    5. Dyslipidemia    6. Class 1 obesity due to excess calories with serious comorbidity and body mass index (BMI) of 30.0 to 30.9 in adult    7. Bruit of right carotid artery    8. Complication of implanted penile prosthesis, unspecified complication, initial encounter ([facility]        PLAN:    1. His diabetes is doing well based on last Hgb A1c. I remind him of the importance of eating his meals at the same time every day and minimizing the consumption of processed carbohydrates. 2. BP is excellent today, no adjustments are made. 3. He will continue his thyroid medication as prescribed and efficacy and compliance will be assessed. 4. He wants to see a urologist for his penile prosthesis. It was placed by Dr. Tristan Okeefe.  5. He will continue statin as prescribed in view of his primary cardiovascular risk prevention. He does have subclinical disease with a right carotid bruit. 6. He needs to minimize weight gain. This can be accomplished by eating meals, eliminating snacks and avoiding the consumption of processed carbohydrates.     .  Orders Placed This Encounter    APOLIPOPROTEIN B    CBC WITH AUTOMATED DIFF    LIPID PANEL    METABOLIC PANEL, COMPREHENSIVE    PROSTATE SPECIFIC AG    URINALYSIS W/ RFLX MICROSCOPIC    TSH 3RD GENERATION    REFERRAL TO UROLOGY         Follow-up and Dispositions    · Return in about 3 months (around 9/18/2019).            Becky Lewis MD

## 2019-06-18 NOTE — PROGRESS NOTES
Chief Complaint   Patient presents with    Diabetes     3 month follow up      1. Have you been to the ER, urgent care clinic since your last visit? Hospitalized since your last visit? No    2. Have you seen or consulted any other health care providers outside of the [address] [place] [address] since your last visit? Include any pap smears or colon screening.  No

## 2019-06-19 LAB
ALBUMIN SERPL-MCNC: 4.3 G/DL (ref 3.6–4.8)
ALBUMIN/GLOB SERPL: 1.8 {RATIO} (ref 1.2–2.2)
ALP SERPL-CCNC: 94 IU/L (ref 39–117)
ALT SERPL-CCNC: 19 IU/L (ref 0–44)
APO B SERPL-MCNC: 77 MG/DL
APPEARANCE UR: CLEAR
AST SERPL-CCNC: 27 IU/L (ref 0–40)
BASOPHILS # BLD AUTO: 0 X10E3/UL (ref 0–0.2)
BASOPHILS NFR BLD AUTO: 1 %
BILIRUB SERPL-MCNC: 0.4 MG/DL (ref 0–1.2)
BILIRUB UR QL STRIP: NEGATIVE
BUN SERPL-MCNC: 15 MG/DL (ref 8–27)
BUN/CREAT SERPL: 13 (ref 10–24)
CALCIUM SERPL-MCNC: 9.8 MG/DL (ref 8.6–10.2)
CHLORIDE SERPL-SCNC: 106 MMOL/L (ref 96–106)
CHOLEST SERPL-MCNC: 123 MG/DL (ref 100–199)
CO2 SERPL-SCNC: 26 MMOL/L (ref 20–29)
COLOR UR: YELLOW
CREAT SERPL-MCNC: 1.15 MG/DL (ref 0.76–1.27)
EOSINOPHIL # BLD AUTO: 0.1 X10E3/UL (ref 0–0.4)
EOSINOPHIL NFR BLD AUTO: 1 %
ERYTHROCYTE [DISTWIDTH] IN BLOOD BY AUTOMATED COUNT: 13.7 % (ref 12.3–15.4)
GLOBULIN SER CALC-MCNC: 2.4 G/DL (ref 1.5–4.5)
GLUCOSE SERPL-MCNC: 52 MG/DL (ref 65–99)
GLUCOSE UR QL: NEGATIVE
HCT VFR BLD AUTO: 47.8 % (ref 37.5–51)
HDLC SERPL-MCNC: 46 MG/DL
HGB BLD-MCNC: 16.4 G/DL (ref 13–17.7)
HGB UR QL STRIP: NEGATIVE
IMM GRANULOCYTES # BLD AUTO: 0 X10E3/UL (ref 0–0.1)
IMM GRANULOCYTES NFR BLD AUTO: 0 %
KETONES UR QL STRIP: ABNORMAL
LDLC SERPL CALC-MCNC: 65 MG/DL (ref 0–99)
LEUKOCYTE ESTERASE UR QL STRIP: NEGATIVE
LYMPHOCYTES # BLD AUTO: 2.4 X10E3/UL (ref 0.7–3.1)
LYMPHOCYTES NFR BLD AUTO: 42 %
MCH RBC QN AUTO: 30.4 PG (ref 26.6–33)
MCHC RBC AUTO-ENTMCNC: 34.3 G/DL (ref 31.5–35.7)
MCV RBC AUTO: 89 FL (ref 79–97)
MICRO URNS: ABNORMAL
MONOCYTES # BLD AUTO: 0.4 X10E3/UL (ref 0.1–0.9)
MONOCYTES NFR BLD AUTO: 7 %
NEUTROPHILS # BLD AUTO: 2.9 X10E3/UL (ref 1.4–7)
NEUTROPHILS NFR BLD AUTO: 49 %
NITRITE UR QL STRIP: NEGATIVE
PH UR STRIP: 5.5 [PH] (ref 5–7.5)
PLATELET # BLD AUTO: 259 X10E3/UL (ref 150–450)
POTASSIUM SERPL-SCNC: 4.8 MMOL/L (ref 3.5–5.2)
PROT SERPL-MCNC: 6.7 G/DL (ref 6–8.5)
PROT UR QL STRIP: NEGATIVE
PSA SERPL-MCNC: 0.4 NG/ML (ref 0–4)
RBC # BLD AUTO: 5.4 X10E6/UL (ref 4.14–5.8)
SODIUM SERPL-SCNC: 145 MMOL/L (ref 134–144)
SP GR UR: >=1.03 (ref 1–1.03)
TRIGL SERPL-MCNC: 59 MG/DL (ref 0–149)
TSH SERPL DL<=0.005 MIU/L-ACNC: 1.47 UIU/ML (ref 0.45–4.5)
UROBILINOGEN UR STRIP-MCNC: 0.2 MG/DL (ref 0.2–1)
VLDLC SERPL CALC-MCNC: 12 MG/DL (ref 5–40)
WBC # BLD AUTO: 5.8 X10E3/UL (ref 3.4–10.8)

## 2019-06-19 RX ORDER — LANCETS 28 GAUGE
EACH MISCELLANEOUS
Qty: 100 LANCET | Refills: 11 | Status: SHIPPED | OUTPATIENT
Start: 2019-06-19 | End: 2020-05-13 | Stop reason: SDUPTHER

## 2019-06-19 RX ORDER — INSULIN PUMP SYRINGE, 3 ML
EACH MISCELLANEOUS
Qty: 1 KIT | Refills: 0 | Status: SHIPPED | OUTPATIENT
Start: 2019-06-19 | End: 2022-08-31 | Stop reason: SDUPTHER

## 2019-07-01 DIAGNOSIS — Z79.4 CONTROLLED TYPE 2 DIABETES MELLITUS WITH BOTH EYES AFFECTED BY MODERATE NONPROLIFERATIVE RETINOPATHY WITHOUT MACULAR EDEMA, WITH LONG-TERM CURRENT USE OF INSULIN (HCC): Primary | ICD-10-CM

## 2019-07-01 DIAGNOSIS — E11.3393 CONTROLLED TYPE 2 DIABETES MELLITUS WITH BOTH EYES AFFECTED BY MODERATE NONPROLIFERATIVE RETINOPATHY WITHOUT MACULAR EDEMA, WITH LONG-TERM CURRENT USE OF INSULIN (HCC): Primary | ICD-10-CM

## 2019-07-01 RX ORDER — METFORMIN HYDROCHLORIDE 500 MG/1
500 TABLET ORAL 2 TIMES DAILY WITH MEALS
Qty: 180 TAB | Refills: 3 | OUTPATIENT
Start: 2019-07-01

## 2019-07-01 RX ORDER — METFORMIN HYDROCHLORIDE 500 MG/1
500 TABLET, EXTENDED RELEASE ORAL 2 TIMES DAILY WITH MEALS
Qty: 60 TAB | Refills: 11 | Status: SHIPPED | OUTPATIENT
Start: 2019-07-01 | End: 2020-06-03 | Stop reason: SDUPTHER

## 2019-07-05 RX ORDER — LISINOPRIL 20 MG/1
20 TABLET ORAL DAILY
Qty: 90 TAB | Refills: 3 | Status: SHIPPED | OUTPATIENT
Start: 2019-07-05 | End: 2020-07-03

## 2019-09-16 ENCOUNTER — OFFICE VISIT (OUTPATIENT)
Dept: INTERNAL MEDICINE CLINIC | Age: 69
End: 2019-09-16

## 2019-09-16 VITALS
DIASTOLIC BLOOD PRESSURE: 77 MMHG | SYSTOLIC BLOOD PRESSURE: 148 MMHG | TEMPERATURE: 98.1 F | OXYGEN SATURATION: 96 % | WEIGHT: 223.1 LBS | HEART RATE: 66 BPM | BODY MASS INDEX: 31.94 KG/M2 | HEIGHT: 70 IN | RESPIRATION RATE: 16 BRPM

## 2019-09-16 DIAGNOSIS — E78.5 DYSLIPIDEMIA: ICD-10-CM

## 2019-09-16 DIAGNOSIS — Z13.39 SCREENING FOR ALCOHOLISM: ICD-10-CM

## 2019-09-16 DIAGNOSIS — I10 ESSENTIAL HYPERTENSION: ICD-10-CM

## 2019-09-16 DIAGNOSIS — Z00.00 WELLNESS EXAMINATION: ICD-10-CM

## 2019-09-16 DIAGNOSIS — E11.3393 CONTROLLED TYPE 2 DIABETES MELLITUS WITH BOTH EYES AFFECTED BY MODERATE NONPROLIFERATIVE RETINOPATHY WITHOUT MACULAR EDEMA, WITH LONG-TERM CURRENT USE OF INSULIN (HCC): ICD-10-CM

## 2019-09-16 DIAGNOSIS — S33.5XXA LUMBAR SPRAIN, INITIAL ENCOUNTER: Primary | ICD-10-CM

## 2019-09-16 DIAGNOSIS — E03.9 ACQUIRED HYPOTHYROIDISM: ICD-10-CM

## 2019-09-16 DIAGNOSIS — M25.511 ACUTE PAIN OF RIGHT SHOULDER: ICD-10-CM

## 2019-09-16 DIAGNOSIS — Z79.4 CONTROLLED TYPE 2 DIABETES MELLITUS WITH BOTH EYES AFFECTED BY MODERATE NONPROLIFERATIVE RETINOPATHY WITHOUT MACULAR EDEMA, WITH LONG-TERM CURRENT USE OF INSULIN (HCC): ICD-10-CM

## 2019-09-16 DIAGNOSIS — Z13.31 SCREENING FOR DEPRESSION: ICD-10-CM

## 2019-09-16 RX ORDER — DICLOFENAC POTASSIUM 50 MG/1
50 TABLET, FILM COATED ORAL 3 TIMES DAILY
COMMUNITY
End: 2020-05-13 | Stop reason: ALTCHOICE

## 2019-09-16 RX ORDER — CYCLOBENZAPRINE HCL 10 MG
10 TABLET ORAL
COMMUNITY
End: 2020-05-13 | Stop reason: ALTCHOICE

## 2019-09-16 NOTE — PROGRESS NOTES
Chief Complaint   Patient presents with   Aby Mobley Annual Wellness Visit     1. Have you been to the ER, urgent care clinic since your last visit? Hospitalized since your last visit? No    2. Have you seen or consulted any other health care providers outside of the [address] [place] [address] since your last visit? Include any pap smears or colon screening.  No
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
## 2019-09-17 LAB
APO B SERPL-MCNC: 88 MG/DL
BUN SERPL-MCNC: 10 MG/DL (ref 8–27)
BUN/CREAT SERPL: 9 (ref 10–24)
CALCIUM SERPL-MCNC: 9.4 MG/DL (ref 8.6–10.2)
CHLORIDE SERPL-SCNC: 105 MMOL/L (ref 96–106)
CO2 SERPL-SCNC: 26 MMOL/L (ref 20–29)
CREAT SERPL-MCNC: 1.06 MG/DL (ref 0.76–1.27)
EST. AVERAGE GLUCOSE BLD GHB EST-MCNC: 163 MG/DL
GLUCOSE SERPL-MCNC: 48 MG/DL (ref 65–99)
HBA1C MFR BLD: 7.3 % (ref 4.8–5.6)
POTASSIUM SERPL-SCNC: 4 MMOL/L (ref 3.5–5.2)
SODIUM SERPL-SCNC: 146 MMOL/L (ref 134–144)
TSH SERPL DL<=0.005 MIU/L-ACNC: 3.72 UIU/ML (ref 0.45–4.5)

## 2019-09-22 NOTE — PATIENT INSTRUCTIONS
Medicare Wellness Visit, Male The best way to live healthy is to have a lifestyle where you eat a well-balanced diet, exercise regularly, limit alcohol use, and quit all forms of tobacco/nicotine, if applicable. Regular preventive services are another way to keep healthy. Preventive services (vaccines, screening tests, monitoring & exams) can help personalize your care plan, which helps you manage your own care. Screening tests can find health problems at the earliest stages, when they are easiest to treat. [facility] follows the current, evidence-based guidelines published by the [facility] ([facility]) when recommending preventive services for our patients. Because we follow these guidelines, sometimes recommendations change over time as research supports it. (For example, a prostate screening blood test is no longer routinely recommended for men with no symptoms.) Of course, you and your doctor may decide to screen more often for some diseases, based on your risk and co-morbidities (chronic disease you are already diagnosed with). Preventive services for you include: - Medicare offers their members a free annual wellness visit, which is time for you and your primary care provider to discuss and plan for your preventive service needs. Take advantage of this benefit every year! 
-All adults over age 72 should receive the recommended pneumonia vaccines. Current USPSTF guidelines recommend a series of two vaccines for the best pneumonia protection.  
-All adults should have a flu vaccine yearly and an ECG.  All adults age 61 and older should receive a shingles vaccine once in their lifetime.   
-All adults age 38-68 who are overweight should have a diabetes screening test once every three years.  
-Other screening tests & preventive services for persons with diabetes include: an eye exam to screen for diabetic retinopathy, a kidney function test, a foot exam, and stricter control over your cholesterol.  
-Cardiovascular screening for adults with routine risk involves an electrocardiogram (ECG) at intervals determined by the provider.  
-Colorectal cancer screening should be done for adults age 54-65 with no increased risk factors for colorectal cancer. There are a number of acceptable methods of screening for this type of cancer. Each test has its own benefits and drawbacks. Discuss with your provider what is most appropriate for you during your annual wellness visit. The different tests include: colonoscopy (considered the best screening method), a fecal occult blood test, a fecal DNA test, and sigmoidoscopy. 
-All adults born between [facility] should be screened once for Hepatitis C. 
-An Abdominal Aortic Aneurysm (AAA) Screening is recommended for men age 73-68 who has ever smoked in their lifetime. Here is a list of your current Health Maintenance items (your personalized list of preventive services) with a due date: 
Health Maintenance Due Topic Date Due  
 DTaP/Tdap/Td  (1 - Tdap) 04/30/1972  Shingles Vaccine (1 of 2) 04/30/2001  Pneumococcal Vaccine (1 of 2 - PCV13) 04/30/2016  Flu Vaccine  08/01/2019  Albumin Urine Test  08/20/2019  Stool testing for trace blood  09/04/2019

## 2019-10-21 DIAGNOSIS — E78.5 DYSLIPIDEMIA: ICD-10-CM

## 2019-10-21 RX ORDER — SIMVASTATIN 40 MG/1
40 TABLET, FILM COATED ORAL
Qty: 30 TAB | Refills: 11 | Status: SHIPPED | OUTPATIENT
Start: 2019-10-21 | End: 2019-12-19 | Stop reason: CLARIF

## 2019-12-16 ENCOUNTER — OFFICE VISIT (OUTPATIENT)
Dept: INTERNAL MEDICINE CLINIC | Age: 69
End: 2019-12-16

## 2019-12-16 VITALS
HEIGHT: 70 IN | BODY MASS INDEX: 32.75 KG/M2 | HEART RATE: 69 BPM | OXYGEN SATURATION: 98 % | WEIGHT: 228.8 LBS | SYSTOLIC BLOOD PRESSURE: 132 MMHG | DIASTOLIC BLOOD PRESSURE: 73 MMHG | RESPIRATION RATE: 16 BRPM | TEMPERATURE: 98 F

## 2019-12-16 DIAGNOSIS — Z79.4 CONTROLLED TYPE 2 DIABETES MELLITUS WITH BOTH EYES AFFECTED BY MODERATE NONPROLIFERATIVE RETINOPATHY WITHOUT MACULAR EDEMA, WITH LONG-TERM CURRENT USE OF INSULIN (HCC): ICD-10-CM

## 2019-12-16 DIAGNOSIS — E66.09 CLASS 1 OBESITY DUE TO EXCESS CALORIES WITH SERIOUS COMORBIDITY AND BODY MASS INDEX (BMI) OF 30.0 TO 30.9 IN ADULT: ICD-10-CM

## 2019-12-16 DIAGNOSIS — E03.9 ACQUIRED HYPOTHYROIDISM: ICD-10-CM

## 2019-12-16 DIAGNOSIS — E78.5 DYSLIPIDEMIA: ICD-10-CM

## 2019-12-16 DIAGNOSIS — E11.3393 CONTROLLED TYPE 2 DIABETES MELLITUS WITH BOTH EYES AFFECTED BY MODERATE NONPROLIFERATIVE RETINOPATHY WITHOUT MACULAR EDEMA, WITH LONG-TERM CURRENT USE OF INSULIN (HCC): ICD-10-CM

## 2019-12-16 DIAGNOSIS — I10 ESSENTIAL HYPERTENSION: Primary | ICD-10-CM
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
## 2019-12-16 NOTE — PROGRESS NOTES
Chief Complaint   Patient presents with    Back Pain     3 month follow up      1. Have you been to the ER, urgent care clinic since your last visit? Hospitalized since your last visit? No    2. Have you seen or consulted any other health care providers outside of the [address] [place] [address] since your last visit? Include any pap smears or colon screening.  No

## 2019-12-17 LAB
ALBUMIN/CREAT UR: 6.6 MG/G CREAT (ref 0–30)
APO B SERPL-MCNC: 102 MG/DL
BUN SERPL-MCNC: 15 MG/DL (ref 8–27)
BUN/CREAT SERPL: 14 (ref 10–24)
CALCIUM SERPL-MCNC: 9.4 MG/DL (ref 8.6–10.2)
CHLORIDE SERPL-SCNC: 105 MMOL/L (ref 96–106)
CO2 SERPL-SCNC: 22 MMOL/L (ref 20–29)
CREAT SERPL-MCNC: 1.09 MG/DL (ref 0.76–1.27)
CREAT UR-MCNC: 124.6 MG/DL
EST. AVERAGE GLUCOSE BLD GHB EST-MCNC: 200 MG/DL
GLUCOSE SERPL-MCNC: 119 MG/DL (ref 65–99)
HBA1C MFR BLD: 8.6 % (ref 4.8–5.6)
MICROALBUMIN UR-MCNC: 8.2 UG/ML
POTASSIUM SERPL-SCNC: 4.4 MMOL/L (ref 3.5–5.2)
SODIUM SERPL-SCNC: 142 MMOL/L (ref 134–144)

## 2019-12-19 DIAGNOSIS — E78.5 DYSLIPIDEMIA: Primary | ICD-10-CM

## 2019-12-19 RX ORDER — ATORVASTATIN CALCIUM 40 MG/1
40 TABLET, FILM COATED ORAL DAILY
Qty: 30 TAB | Refills: 11 | Status: SHIPPED | OUTPATIENT
Start: 2019-12-19 | End: 2020-11-20 | Stop reason: SDUPTHER

## 2019-12-20 NOTE — PROGRESS NOTES
Increase insulin by 4 units each way. Remind patient to eat at the same time every day. Discontinue simvastatin in favor of atorvastatin.

## 2019-12-23 NOTE — PROGRESS NOTES
Patient contacted via phone advised to increase insulin by 4 units, reminded to eat the same time every day and to discontinue simvastatin; start atorvastatin per Dr. Geovanny Kwok. Pt verbalized understanding.

## 2020-02-11 ENCOUNTER — OFFICE VISIT (OUTPATIENT)
Dept: INTERNAL MEDICINE CLINIC | Age: 70
End: 2020-02-11

## 2020-02-11 VITALS
HEART RATE: 69 BPM | DIASTOLIC BLOOD PRESSURE: 74 MMHG | WEIGHT: 235.6 LBS | BODY MASS INDEX: 33.73 KG/M2 | SYSTOLIC BLOOD PRESSURE: 139 MMHG | OXYGEN SATURATION: 96 % | RESPIRATION RATE: 16 BRPM | HEIGHT: 70 IN | TEMPERATURE: 98.4 F

## 2020-02-11 DIAGNOSIS — I10 ESSENTIAL HYPERTENSION: ICD-10-CM

## 2020-02-11 DIAGNOSIS — E11.3393 CONTROLLED TYPE 2 DIABETES MELLITUS WITH BOTH EYES AFFECTED BY MODERATE NONPROLIFERATIVE RETINOPATHY WITHOUT MACULAR EDEMA, WITH LONG-TERM CURRENT USE OF INSULIN (HCC): ICD-10-CM

## 2020-02-11 DIAGNOSIS — Z79.4 CONTROLLED TYPE 2 DIABETES MELLITUS WITH BOTH EYES AFFECTED BY MODERATE NONPROLIFERATIVE RETINOPATHY WITHOUT MACULAR EDEMA, WITH LONG-TERM CURRENT USE OF INSULIN (HCC): ICD-10-CM

## 2020-02-11 DIAGNOSIS — R45.4 ANGER REACTION: Primary | ICD-10-CM

## 2020-02-11 DIAGNOSIS — E78.5 DYSLIPIDEMIA: ICD-10-CM

## 2020-02-11 NOTE — PROGRESS NOTES
Chief Complaint   Patient presents with    Stress     Patient would like to discuss \"emotional stress\" for about two months. 1. Have you been to the ER, urgent care clinic since your last visit? Hospitalized since your last visit? No    2. Have you seen or consulted any other health care providers outside of the [address] [place] [address] since your last visit? Include any pap smears or colon screening.  No
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
## 2020-03-16 ENCOUNTER — OFFICE VISIT (OUTPATIENT)
Dept: INTERNAL MEDICINE CLINIC | Age: 70
End: 2020-03-16

## 2020-03-16 VITALS
SYSTOLIC BLOOD PRESSURE: 131 MMHG | TEMPERATURE: 97.6 F | BODY MASS INDEX: 32.5 KG/M2 | HEART RATE: 67 BPM | OXYGEN SATURATION: 95 % | RESPIRATION RATE: 16 BRPM | WEIGHT: 227 LBS | DIASTOLIC BLOOD PRESSURE: 80 MMHG | HEIGHT: 70 IN

## 2020-03-16 DIAGNOSIS — E78.5 DYSLIPIDEMIA: ICD-10-CM

## 2020-03-16 DIAGNOSIS — Z79.4 CONTROLLED TYPE 2 DIABETES MELLITUS WITH BOTH EYES AFFECTED BY MODERATE NONPROLIFERATIVE RETINOPATHY WITHOUT MACULAR EDEMA, WITH LONG-TERM CURRENT USE OF INSULIN (HCC): Primary | ICD-10-CM

## 2020-03-16 DIAGNOSIS — E66.09 CLASS 1 OBESITY DUE TO EXCESS CALORIES WITH SERIOUS COMORBIDITY AND BODY MASS INDEX (BMI) OF 30.0 TO 30.9 IN ADULT: ICD-10-CM

## 2020-03-16 DIAGNOSIS — E11.3393 CONTROLLED TYPE 2 DIABETES MELLITUS WITH BOTH EYES AFFECTED BY MODERATE NONPROLIFERATIVE RETINOPATHY WITHOUT MACULAR EDEMA, WITH LONG-TERM CURRENT USE OF INSULIN (HCC): Primary | ICD-10-CM

## 2020-03-16 DIAGNOSIS — I10 ESSENTIAL HYPERTENSION: ICD-10-CM

## 2020-03-16 NOTE — PROGRESS NOTES
[facility]  [place] [address]  [address] [place] [address]  Phone:  [phone]  Fax: [phone]       Chief Complaint   Patient presents with    Hypertension     3 month follow up     Other     Patient requesting referral to opthomology. .      SUBJECTIVE:    Ana Rivas is a 77 y.o. male Comes in for return visit stating that he is really not doing the things he needs to do for better diabetic control. He is taking 20 units of his premixed insulin twice a day. Average FBS is in the 200+ range. The last time he checked his sugar above and beyond today was three weeks ago. He has a past history of primary hypertension, dyslipidemia and hypothyroidism. He also is obese, but is not gaining any weight, which is fortunate. Current Outpatient Medications   Medication Sig Dispense Refill    atorvastatin (LIPITOR) 40 mg tablet Take 1 Tab by mouth daily. 30 Tab 11    Insulin Syringe-Needle U-100 0.5 mL 31 gauge x 5/16\" syrg USE TO INJECT INSULIN  TWICE DAILY 70 Syringe 11    diclofenac potassium (CATAFLAM) 50 mg tablet Take 50 mg by mouth three (3) times daily.  cyclobenzaprine (FLEXERIL) 10 mg tablet Take 10 mg by mouth every eight (8) hours as needed for Muscle Spasm(s).  insulin NPH/insulin regular (HUMULIN 70/30 U-100 INSULIN) 100 unit/mL (70-30) injection 24 Units by SubCUTAneous route Before breakfast and dinner. Indications: type 2 diabetes mellitus 20 mL 11    lisinopril (PRINIVIL, ZESTRIL) 20 mg tablet Take 1 Tab by mouth daily. 90 Tab 3    metFORMIN ER (GLUCOPHAGE XR) 500 mg tablet Take 1 Tab by mouth two (2) times daily (with meals). 60 Tab 11    Blood-Glucose Meter (FREESTYLE LITE METER) monitoring kit Use to test blood sugar three times a day. Dx.e11.9 1 Kit 0    glucose blood VI test strips (FREESTYLE LITE STRIPS) strip Use to test blood sugar three times a day. dx.e11.9 100 Strip 11    lancets (FREESTYLE LANCETS) 28 gauge misc Use to test blood sugar three times a day. dx.e11.9 100 Lancet 11    levothyroxine (SYNTHROID) 100 mcg tablet Take 1 Tab by mouth Daily (before breakfast). 90 Tab 3    blood-glucose meter (FREESTYLE LITE METER) by Does Not Apply route.  blood sugar diagnostic (FREESTYLE LITE STRIPS) by In Vitro route.  co-enzyme Q-10 (COQ-10) 100 mg capsule Take 100 mg by mouth daily.  aspirin (AGATHA CHEWABLE ASPIRIN) 81 mg chewable tablet Take 81 mg by mouth daily.  multivit,tx with iron,minerals (COMPLETE MULTIVITAMIN PO) Take 1 Tab by mouth daily.        Past Medical History:   Diagnosis Date    Diabetes ([facility] [address]     Hypertension      Past Surgical History:   Procedure Laterality Date    ABDOMEN SURGERY PROC UNLISTED      hemmoroidectomy    HX COLONOSCOPY  2015    Joe----unremarkable    HX HEENT      laser for retinopathy    HX ORTHOPAEDIC      achilles tendon surgery    HX REFRACTIVE SURGERY       No Known Allergies      REVIEW OF SYSTEMS:  General: negative for - chills or fever  ENT: negative for - headaches, nasal congestion or tinnitus  Respiratory: negative for - cough, hemoptysis, shortness of breath or wheezing  Cardiovascular : negative for - chest pain, edema, palpitations or shortness of breath  Gastrointestinal: negative for - abdominal pain, blood in stools, heartburn or nausea/vomiting  Genito-Urinary: no dysuria, trouble voiding, or hematuria  Musculoskeletal: negative for - gait disturbance, joint pain, joint stiffness or joint swelling  Neurological: no TIA or stroke symptoms  Hematologic: no bruises, no bleeding, no swollen glands  Integument: no lumps, mole changes, nail changes or rash  Endocrine: no malaise/lethargy or unexpected weight changes      Social History     Socioeconomic History    Marital status: UNKNOWN     Spouse name: Not on file    Number of children: 4    Years of education: Not on file    Highest education level: Not on file   Occupational History    Occupation: retired fro [employer] Occupation: retired [employer] after 5 years   Tobacco Use    Smoking status: Former Smoker    Smokeless tobacco: Never Used   Substance and Sexual Activity    Alcohol use: Yes     Alcohol/week: 1.0 standard drinks     Types: 1 Glasses of wine per week    Drug use: No    Sexual activity: Yes     Family History   Problem Relation Age of Onset    Heart Disease Mother        OBJECTIVE:    Visit Vitals  /80   Pulse 67   Temp 97.6 °F (36.4 °C) (Oral)   Resp 16   Ht 5' 10\" (1.778 m)   Wt 227 lb (103 kg)   SpO2 95%   BMI 32.57 kg/m²     CONSTITUTIONAL: well , well nourished, appears age appropriate  EYES: perrla, eom intact  ENMT:moist mucous membranes, pharynx clear  NECK: supple. Thyroid normal  RESPIRATORY: Chest: clear to ascultation and percussion   CARDIOVASCULAR: Heart: regular rate and rhythm  GASTROINTESTINAL: Abdomen: soft, bowel sounds active  HEMATOLOGIC: no pathological lymph nodes palpated  MUSCULOSKELETAL: Extremities: no edema, pulse 1+   INTEGUMENT: No unusual rashes or suspicious skin lesions noted. Nails appear normal.  NEUROLOGIC: non-focal exam   MENTAL STATUS: alert and oriented, appropriate affect      ASSESSMENT:  1. 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 Dyslipidemia    3. Class 1 obesity due to excess calories with serious comorbidity and body mass index (BMI) of 30.0 to 30.9 in adult    4. Essential hypertension        PLAN:    1. Patient's diabetes is poorly controlled. The first thing I emphasized to him is the importance of eating at the same time every day, the second time is checking blood sugars at least twice a day before breakfast and before dinner. I will increase his insulin to 26 units before breakfast and dinner. Additionally, he has to reduce his consumption of processed carbohydrates.   2. He will continue statin as prescribed and efficacy and compliance will be assessed. 3. Weight reduction is needed. This can be accomplished by eating meals, eliminating snacks and avoiding the consumption of processed carbohydrates. 4. Blood pressure is excellent today, no adjustments are made. 5. He states that he will definitely improve his compliance. Follow-up and Dispositions    · Return in about 4 weeks (around 4/13/2020).            Sancho Steiner MD

## 2020-03-16 NOTE — PROGRESS NOTES
Chief Complaint   Patient presents with    Hypertension     3 month follow up      1. Have you been to the ER, urgent care clinic since your last visit? Hospitalized since your last visit? No    2. Have you seen or consulted any other health care providers outside of the [address] [place] [address] since your last visit? Include any pap smears or colon screening.  No

## 2020-03-17 LAB
APO B SERPL-MCNC: 85 MG/DL
EST. AVERAGE GLUCOSE BLD GHB EST-MCNC: 240 MG/DL
HBA1C MFR BLD: 10 % (ref 4.8–5.6)

## 2020-05-05 RX ORDER — LANCETS 28 GAUGE
EACH MISCELLANEOUS
Qty: 100 LANCET | Refills: 11 | Status: SHIPPED | OUTPATIENT
Start: 2020-05-05 | End: 2021-05-23

## 2020-05-13 ENCOUNTER — VIRTUAL VISIT (OUTPATIENT)
Dept: INTERNAL MEDICINE CLINIC | Age: 70
End: 2020-05-13

## 2020-05-13 DIAGNOSIS — I10 ESSENTIAL HYPERTENSION: ICD-10-CM

## 2020-05-13 DIAGNOSIS — E11.3393 CONTROLLED TYPE 2 DIABETES MELLITUS WITH BOTH EYES AFFECTED BY MODERATE NONPROLIFERATIVE RETINOPATHY WITHOUT MACULAR EDEMA, WITH LONG-TERM CURRENT USE OF INSULIN (HCC): ICD-10-CM

## 2020-05-13 DIAGNOSIS — Z79.4 CONTROLLED TYPE 2 DIABETES MELLITUS WITH BOTH EYES AFFECTED BY MODERATE NONPROLIFERATIVE RETINOPATHY WITHOUT MACULAR EDEMA, WITH LONG-TERM CURRENT USE OF INSULIN (HCC): ICD-10-CM

## 2020-05-13 DIAGNOSIS — E66.09 CLASS 1 OBESITY DUE TO EXCESS CALORIES WITH SERIOUS COMORBIDITY AND BODY MASS INDEX (BMI) OF 30.0 TO 30.9 IN ADULT: ICD-10-CM

## 2020-05-13 DIAGNOSIS — E78.5 DYSLIPIDEMIA: ICD-10-CM

## 2020-05-13 DIAGNOSIS — R45.4 DIFFICULTY CONTROLLING ANGER: Primary | ICD-10-CM

## 2020-05-13 NOTE — PROGRESS NOTES
Garrett Jameson is a 72 y.o. male who was seen by synchronous (real-time) audio-video technology on 5/13/2020. Consent: Garrett Jameson, who was seen by synchronous (real-time) audio-video technology, and/or his healthcare decision maker, is aware that this patient-initiated, Telehealth encounter on 5/13/2020 is a billable service, with coverage as determined by his insurance carrier. He is aware that he may receive a bill and has provided verbal consent to proceed: Yes. Assessment & Plan:   Diagnoses and all orders for this visit:    1. Difficulty controlling anger  -     REFERRAL TO PSYCHOLOGY    2. 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 Essential hypertension    4. Dyslipidemia    5. Class 1 obesity due to excess calories with serious comorbidity and body mass index (BMI) of 30.0 to 30.9 in adult      1. The patient is seeing Dr. Ryan Braswell, a psychologist, for his apparent anger difficulty. He needs a referral which he has not been getting for his visits. His initial visit started in 02/18/2020 and he has been seeing him every 2 weeks. His next appointment and 05/21/2020.  2. His diabetes is not optimally controlled. Averages are in the 150+ range. He is currently taking pre-mix insulin 26 units b.i.d.  I will increase this to 30 units b.i.d.  I remind him the importance of eating meals in a timed manner and his bedtime snack. Specifically, he is to eat at the same time every day. 3. He remains on his antihypertensive medication. 4. He has a significantly increased cardiovascular risk and as a result is taking a statin. 5. Ideally, he needs to lose weight. This can be accomplished by eating meals, eliminating snacks, and avoiding the consumption of processed carbohydrates.               Subjective:   Garrett Jameson is a 72 y.o. male who was seen for Diabetes  Patient states that he generally has been doing well. He has been seeing a psychologist and feels much better as a direct result of his visits to the psychologist as relates to anger problems. He initially started seeing him in 02/2020 and is basically seen every 2 weeks. He needs a referral which he never got to begin with. Basically, he never requested one. He states his diabetes has been doing fairly well. His blood sugars have been consistently in the 150+ range. Hopefully, there has been no meaningful weight gain. I remind him the importance of limiting his consumption of processed carbohydrates. He has a past history primary hypertension as well as dyslipidemia. Prior to Admission medications    Medication Sig Start Date End Date Taking? Authorizing Provider   lancets (FreeStyle Lancets) 28 gauge misc Use to test blood sugar three times a day. Dx.e11.9 5/5/20  Yes Makayla Gonzalez MD   levothyroxine (SYNTHROID) 100 mcg tablet TAKE 1 TABLET BY MOUTH ONCE DAILY BEFORE BREAKFAST 5/2/20  Yes Makayla Gonzalez MD   atorvastatin (LIPITOR) 40 mg tablet Take 1 Tab by mouth daily. 12/19/19  Yes Makayla Gonzalez MD   Insulin Syringe-Needle U-100 0.5 mL 31 gauge x 5/16\" syrg USE TO INJECT INSULIN  TWICE DAILY 11/26/19  Yes Makayla Gonzalez MD   insulin NPH/insulin regular (HUMULIN 70/30 U-100 INSULIN) 100 unit/mL (70-30) injection 24 Units by SubCUTAneous route Before breakfast and dinner. Indications: type 2 diabetes mellitus 7/31/19  Yes Makayla Gonzalez MD   lisinopril (PRINIVIL, ZESTRIL) 20 mg tablet Take 1 Tab by mouth daily. 7/5/19  Yes Makayla Gonzalez MD   metFORMIN ER (GLUCOPHAGE XR) 500 mg tablet Take 1 Tab by mouth two (2) times daily (with meals). 7/1/19  Yes Makayla Gonzalez MD   Blood-Glucose Meter (FREESTYLE LITE METER) monitoring kit Use to test blood sugar three times a day.  Dx.e11.9 6/19/19  Yes Makayla Gonzalez MD   blood-glucose meter (FREESTYLE LITE METER) by Does Not Apply route. Yes Provider, Historical   blood sugar diagnostic (FREESTYLE LITE STRIPS) by In Vitro route. Yes Provider, Historical   co-enzyme Q-10 (COQ-10) 100 mg capsule Take 100 mg by mouth daily. Yes Provider, Historical   aspirin (AGATHA CHEWABLE ASPIRIN) 81 mg chewable tablet Take 81 mg by mouth daily. Yes Provider, Historical   multivit,tx with iron,minerals (COMPLETE MULTIVITAMIN PO) Take 1 Tab by mouth daily. Yes Provider, Historical     No Known Allergies    Current Outpatient Medications   Medication Sig Dispense Refill    lancets (FreeStyle Lancets) 28 gauge misc Use to test blood sugar three times a day. Dx.e11.9 100 Lancet 11    levothyroxine (SYNTHROID) 100 mcg tablet TAKE 1 TABLET BY MOUTH ONCE DAILY BEFORE BREAKFAST 90 Tab 3    atorvastatin (LIPITOR) 40 mg tablet Take 1 Tab by mouth daily. 30 Tab 11    Insulin Syringe-Needle U-100 0.5 mL 31 gauge x 5/16\" syrg USE TO INJECT INSULIN  TWICE DAILY 70 Syringe 11    insulin NPH/insulin regular (HUMULIN 70/30 U-100 INSULIN) 100 unit/mL (70-30) injection 24 Units by SubCUTAneous route Before breakfast and dinner. Indications: type 2 diabetes mellitus 20 mL 11    lisinopril (PRINIVIL, ZESTRIL) 20 mg tablet Take 1 Tab by mouth daily. 90 Tab 3    metFORMIN ER (GLUCOPHAGE XR) 500 mg tablet Take 1 Tab by mouth two (2) times daily (with meals). 60 Tab 11    Blood-Glucose Meter (FREESTYLE LITE METER) monitoring kit Use to test blood sugar three times a day. Dx.e11.9 1 Kit 0    blood-glucose meter (FREESTYLE LITE METER) by Does Not Apply route.  blood sugar diagnostic (FREESTYLE LITE STRIPS) by In Vitro route.  co-enzyme Q-10 (COQ-10) 100 mg capsule Take 100 mg by mouth daily.  aspirin (AGATHA CHEWABLE ASPIRIN) 81 mg chewable tablet Take 81 mg by mouth daily.  multivit,tx with iron,minerals (COMPLETE MULTIVITAMIN PO) Take 1 Tab by mouth daily.        No Known Allergies  Past Medical History:   Diagnosis Date    Diabetes ([facility] [address]  Hypertension      Past Surgical History:   Procedure Laterality Date    ABDOMEN SURGERY PROC UNLISTED      hemmoroidectomy    HX COLONOSCOPY  2015    Joe----unremarkable    HX HEENT      laser for retinopathy    HX ORTHOPAEDIC      achilles tendon surgery    HX REFRACTIVE SURGERY       Family History   Problem Relation Age of Onset    Heart Disease Mother        ROS:14 point ROS neg. Objective:   Vital Signs: (As obtained by patient/caregiver at home)  There were no vitals taken for this visit.      [INSTRUCTIONS:  \"[x]\" Indicates a positive item  \"[]\" Indicates a negative item  -- DELETE ALL ITEMS NOT EXAMINED]    Constitutional: [x] Appears well-developed and well-nourished [x] No apparent distress      [] Abnormal -     Mental status: [x] Alert and awake  [x] Oriented to person/place/time [x] Able to follow commands    [] Abnormal -     Eyes:   EOM    [x]  Normal    [] Abnormal -   Sclera  [x]  Normal    [] Abnormal -          Discharge [x]  None visible   [] Abnormal -     HENT: [x] Normocephalic, atraumatic  [] Abnormal -   [x] Mouth/Throat: Mucous membranes are moist    External Ears [x] Normal  [] Abnormal -    Neck: [x] No visualized mass [] Abnormal -     Pulmonary/Chest: [x] Respiratory effort normal   [x] No visualized signs of difficulty breathing or respiratory distress        [] Abnormal -      Musculoskeletal:   [x] Normal gait with no signs of ataxia         [x] Normal range of motion of neck        [] Abnormal -     Neurological:        [x] No Facial Asymmetry (Cranial nerve 7 motor function) (limited exam due to video visit)          [x] No gaze palsy        [] Abnormal -          Skin:        [x] No significant exanthematous lesions or discoloration noted on facial skin         [] Abnormal -            Psychiatric:       [x] Normal Affect [] Abnormal -        [x] No Hallucinations    Other pertinent observable physical exam findings:-        We discussed the expected course, resolution and complications of the diagnosis(es) in detail. Medication risks, benefits, costs, interactions, and alternatives were discussed as indicated. I advised him to contact the office if his condition worsens, changes or fails to improve as anticipated. He expressed understanding with the diagnosis(es) and plan. Brandyn Alex is a 72 y.o. male who was evaluated by a video visit encounter for concerns as above. Patient identification was verified prior to start of the visit. A caregiver was present when appropriate. Due to this being a TeleHealth encounter (During CJOAN-94 public health emergency), evaluation of the following organ systems was limited: Vitals/Constitutional/EENT/Resp/CV/GI//MS/Neuro/Skin/Heme-Lymph-Imm. Pursuant to the emergency declaration under the [address] [place] [address], [facility] waiver authority and the [employer] and [employer] Act, this Virtual  Visit was conducted, with patient's (and/or legal guardian's) consent, to reduce the patient's risk of exposure to COVID-19 and provide necessary medical care. Services were provided through a video synchronous discussion virtually to substitute for in-person clinic visit. Patient and provider were located at their individual homes. Please note that this dictation was completed with [employer], the computer voice recognition software. Quite often unanticipated grammatical, syntax, homophones, and other interpretive errors are inadvertently transcribed by the computer software. Please disregard these errors. Please excuse any errors that have escaped final proofreading. Thank you.   Lucy Navarro MD

## 2020-05-13 NOTE — PROGRESS NOTES
Chief Complaint   Patient presents with    Diabetes     1. Have you been to the ER, urgent care clinic since your last visit? Hospitalized since your last visit? No    2. Have you seen or consulted any other health care providers outside of the [address] [place] [address] since your last visit? Include any pap smears or colon screening.  No

## 2020-05-15 ENCOUNTER — CLINICAL SUPPORT (OUTPATIENT)
Dept: INTERNAL MEDICINE CLINIC | Age: 70
End: 2020-05-15

## 2020-05-15 DIAGNOSIS — E03.9 ACQUIRED HYPOTHYROIDISM: ICD-10-CM

## 2020-05-15 DIAGNOSIS — Z79.4 CONTROLLED TYPE 2 DIABETES MELLITUS WITH BOTH EYES AFFECTED BY MODERATE NONPROLIFERATIVE RETINOPATHY WITHOUT MACULAR EDEMA, WITH LONG-TERM CURRENT USE OF INSULIN (HCC): Primary | ICD-10-CM

## 2020-05-15 DIAGNOSIS — I10 ESSENTIAL HYPERTENSION: ICD-10-CM

## 2020-05-15 DIAGNOSIS — E11.3393 CONTROLLED TYPE 2 DIABETES MELLITUS WITH BOTH EYES AFFECTED BY MODERATE NONPROLIFERATIVE RETINOPATHY WITHOUT MACULAR EDEMA, WITH LONG-TERM CURRENT USE OF INSULIN (HCC): Primary | ICD-10-CM

## 2020-05-15 DIAGNOSIS — N40.0 BENIGN PROSTATIC HYPERPLASIA WITHOUT LOWER URINARY TRACT SYMPTOMS: ICD-10-CM

## 2020-05-15 DIAGNOSIS — E78.5 DYSLIPIDEMIA: ICD-10-CM

## 2020-05-20 LAB
ALBUMIN SERPL-MCNC: 4.3 G/DL (ref 3.8–4.8)
ALBUMIN/GLOB SERPL: 2 {RATIO} (ref 1.2–2.2)
ALP SERPL-CCNC: 151 IU/L (ref 39–117)
ALT SERPL-CCNC: 16 IU/L (ref 0–44)
APO B SERPL-MCNC: 79 MG/DL
APPEARANCE UR: CLEAR
AST SERPL-CCNC: 19 IU/L (ref 0–40)
BASOPHILS # BLD AUTO: 0.1 X10E3/UL (ref 0–0.2)
BASOPHILS NFR BLD AUTO: 1 %
BILIRUB SERPL-MCNC: 0.4 MG/DL (ref 0–1.2)
BILIRUB UR QL STRIP: NEGATIVE
BUN SERPL-MCNC: 13 MG/DL (ref 8–27)
BUN/CREAT SERPL: 10 (ref 10–24)
CALCIUM SERPL-MCNC: 9.8 MG/DL (ref 8.6–10.2)
CHLORIDE SERPL-SCNC: 109 MMOL/L (ref 96–106)
CHOLEST SERPL-MCNC: 119 MG/DL (ref 100–199)
CO2 SERPL-SCNC: 23 MMOL/L (ref 20–29)
COLOR UR: YELLOW
CREAT SERPL-MCNC: 1.27 MG/DL (ref 0.76–1.27)
EOSINOPHIL # BLD AUTO: 0.2 X10E3/UL (ref 0–0.4)
EOSINOPHIL NFR BLD AUTO: 3 %
ERYTHROCYTE [DISTWIDTH] IN BLOOD BY AUTOMATED COUNT: 12.5 % (ref 11.6–15.4)
GLOBULIN SER CALC-MCNC: 2.1 G/DL (ref 1.5–4.5)
GLUCOSE SERPL-MCNC: 63 MG/DL (ref 65–99)
GLUCOSE UR QL: NEGATIVE
HCT VFR BLD AUTO: 46.2 % (ref 37.5–51)
HDLC SERPL-MCNC: 34 MG/DL
HGB BLD-MCNC: 15.8 G/DL (ref 13–17.7)
HGB UR QL STRIP: NEGATIVE
IMM GRANULOCYTES # BLD AUTO: 0.1 X10E3/UL (ref 0–0.1)
IMM GRANULOCYTES NFR BLD AUTO: 2 %
KETONES UR QL STRIP: NEGATIVE
LDLC SERPL CALC-MCNC: 61 MG/DL (ref 0–99)
LEUKOCYTE ESTERASE UR QL STRIP: NEGATIVE
LYMPHOCYTES # BLD AUTO: 2.7 X10E3/UL (ref 0.7–3.1)
LYMPHOCYTES NFR BLD AUTO: 45 %
MCH RBC QN AUTO: 28.9 PG (ref 26.6–33)
MCHC RBC AUTO-ENTMCNC: 34.2 G/DL (ref 31.5–35.7)
MCV RBC AUTO: 85 FL (ref 79–97)
MICRO URNS: ABNORMAL
MONOCYTES # BLD AUTO: 0.6 X10E3/UL (ref 0.1–0.9)
MONOCYTES NFR BLD AUTO: 10 %
NEUTROPHILS # BLD AUTO: 2.3 X10E3/UL (ref 1.4–7)
NEUTROPHILS NFR BLD AUTO: 39 %
NITRITE UR QL STRIP: NEGATIVE
PH UR STRIP: 8 [PH] (ref 5–7.5)
PLATELET # BLD AUTO: 280 X10E3/UL (ref 150–450)
POTASSIUM SERPL-SCNC: 5.2 MMOL/L (ref 3.5–5.2)
PROT SERPL-MCNC: 6.4 G/DL (ref 6–8.5)
PROT UR QL STRIP: NEGATIVE
PSA SERPL-MCNC: 0.4 NG/ML (ref 0–4)
RBC # BLD AUTO: 5.47 X10E6/UL (ref 4.14–5.8)
SODIUM SERPL-SCNC: 146 MMOL/L (ref 134–144)
SP GR UR: 1.02 (ref 1–1.03)
TRIGL SERPL-MCNC: 118 MG/DL (ref 0–149)
UROBILINOGEN UR STRIP-MCNC: 0.2 MG/DL (ref 0.2–1)
VLDLC SERPL CALC-MCNC: 24 MG/DL (ref 5–40)
WBC # BLD AUTO: 5.9 X10E3/UL (ref 3.4–10.8)

## 2020-05-29 ENCOUNTER — OFFICE VISIT (OUTPATIENT)
Dept: INTERNAL MEDICINE CLINIC | Age: 70
End: 2020-05-29

## 2020-05-29 VITALS
DIASTOLIC BLOOD PRESSURE: 80 MMHG | BODY MASS INDEX: 33.96 KG/M2 | HEART RATE: 78 BPM | OXYGEN SATURATION: 96 % | SYSTOLIC BLOOD PRESSURE: 146 MMHG | RESPIRATION RATE: 16 BRPM | WEIGHT: 237.2 LBS | HEIGHT: 70 IN | TEMPERATURE: 98.1 F

## 2020-05-29 DIAGNOSIS — E11.3393 CONTROLLED TYPE 2 DIABETES MELLITUS WITH BOTH EYES AFFECTED BY MODERATE NONPROLIFERATIVE RETINOPATHY WITHOUT MACULAR EDEMA, WITH LONG-TERM CURRENT USE OF INSULIN (HCC): Primary | ICD-10-CM

## 2020-05-29 DIAGNOSIS — E66.09 CLASS 1 OBESITY DUE TO EXCESS CALORIES WITH SERIOUS COMORBIDITY AND BODY MASS INDEX (BMI) OF 30.0 TO 30.9 IN ADULT: ICD-10-CM

## 2020-05-29 DIAGNOSIS — I10 ESSENTIAL HYPERTENSION: ICD-10-CM

## 2020-05-29 DIAGNOSIS — E78.5 DYSLIPIDEMIA: ICD-10-CM

## 2020-05-29 DIAGNOSIS — R45.4 DIFFICULTY CONTROLLING ANGER: ICD-10-CM

## 2020-05-29 DIAGNOSIS — Z79.4 CONTROLLED TYPE 2 DIABETES MELLITUS WITH BOTH EYES AFFECTED BY MODERATE NONPROLIFERATIVE RETINOPATHY WITHOUT MACULAR EDEMA, WITH LONG-TERM CURRENT USE OF INSULIN (HCC): Primary | ICD-10-CM

## 2020-05-29 PROBLEM — E11.21 TYPE 2 DIABETES WITH NEPHROPATHY (HCC): Status: ACTIVE | Noted: 2020-05-29

## 2020-05-29 NOTE — PROGRESS NOTES
[facility]  [place] [address]  [address] [place] [address]  Phone:  [phone]  Fax: [phone]       Chief Complaint   Patient presents with    Diabetes     1 month follow up    . SUBJECTIVE:    Debbie Styles is a 72 y.o. male patient comes in for follow-up of his diabetes. He is now taking 30 units of premixed insulin twice a day. Blood sugars have improved significantly. He brings a log in and they have indeed decreased. In spite of the decrease he has not had any symptomatic hypoglycemia. He is actively seeing his psychologist for anger management which was requested by the legal system. He has a past history of primary hypertension dyslipidemia and hypothyroidism. Unfortunately he is not that physically active. Current Outpatient Medications   Medication Sig Dispense Refill    lancets (FreeStyle Lancets) 28 gauge misc Use to test blood sugar three times a day. Dx.e11.9 100 Lancet 11    levothyroxine (SYNTHROID) 100 mcg tablet TAKE 1 TABLET BY MOUTH ONCE DAILY BEFORE BREAKFAST 90 Tab 3    atorvastatin (LIPITOR) 40 mg tablet Take 1 Tab by mouth daily. 30 Tab 11    Insulin Syringe-Needle U-100 0.5 mL 31 gauge x 5/16\" syrg USE TO INJECT INSULIN  TWICE DAILY 70 Syringe 11    insulin NPH/insulin regular (HUMULIN 70/30 U-100 INSULIN) 100 unit/mL (70-30) injection 24 Units by SubCUTAneous route Before breakfast and dinner. Indications: type 2 diabetes mellitus 20 mL 11    lisinopril (PRINIVIL, ZESTRIL) 20 mg tablet Take 1 Tab by mouth daily. 90 Tab 3    metFORMIN ER (GLUCOPHAGE XR) 500 mg tablet Take 1 Tab by mouth two (2) times daily (with meals). 60 Tab 11    Blood-Glucose Meter (FREESTYLE LITE METER) monitoring kit Use to test blood sugar three times a day. Dx.e11.9 1 Kit 0    blood-glucose meter (FREESTYLE LITE METER) by Does Not Apply route.  blood sugar diagnostic (FREESTYLE LITE STRIPS) by In Vitro route.       co-enzyme Q-10 (COQ-10) 100 mg capsule Take 100 mg by mouth daily.  aspirin (AGATHA CHEWABLE ASPIRIN) 81 mg chewable tablet Take 81 mg by mouth daily.  multivit,tx with iron,minerals (COMPLETE MULTIVITAMIN PO) Take 1 Tab by mouth daily. Past Medical History:   Diagnosis Date    Diabetes ([facility] [address]     Hypertension      Past Surgical History:   Procedure Laterality Date    ABDOMEN SURGERY PROC UNLISTED      hemmoroidectomy    HX COLONOSCOPY  2015    Joe----unremarkable    HX HEENT      laser for retinopathy    HX ORTHOPAEDIC      achilles tendon surgery    HX REFRACTIVE SURGERY       No Known Allergies      REVIEW OF SYSTEMS:  General: negative for - chills or fever  ENT: negative for - headaches, nasal congestion or tinnitus  Respiratory: negative for - cough, hemoptysis, shortness of breath or wheezing  Cardiovascular : negative for - chest pain, edema, palpitations or shortness of breath  Gastrointestinal: negative for - abdominal pain, blood in stools, heartburn or nausea/vomiting  Genito-Urinary: no dysuria, trouble voiding, or hematuria  Musculoskeletal: negative for - gait disturbance, joint pain, joint stiffness or joint swelling  Neurological: no TIA or stroke symptoms  Hematologic: no bruises, no bleeding, no swollen glands  Integument: no lumps, mole changes, nail changes or rash  Endocrine: no malaise/lethargy or unexpected weight changes      Social History     Socioeconomic History    Marital status: UNKNOWN     Spouse name: Not on file    Number of children: 4    Years of education: Not on file    Highest education level: Not on file   Occupational History    Occupation: retired fro [employer] 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   Tobacco Use    Smoking status: Former Smoker    Smokeless tobacco: Never Used   Substance and Sexual Activity    Alcohol use:  Yes     Alcohol/week: 1.0 standard drinks     Types: 1 Glasses of wine per week    Drug use: No    Sexual activity: Yes     Family History   Problem Relation Age of Onset    Heart Disease Mother        OBJECTIVE:    Visit Vitals  /80   Pulse 78   Temp 98.1 °F (36.7 °C) (Oral)   Resp 16   Ht 5' 10\" (1.778 m)   Wt 237 lb 3.2 oz (107.6 kg)   SpO2 96%   BMI 34.03 kg/m²     CONSTITUTIONAL: well , well nourished, appears age appropriate  EYES: perrla, eom intact  ENMT:moist mucous membranes, pharynx clear  NECK: supple. Thyroid normal  RESPIRATORY: Chest: clear to ascultation and percussion   CARDIOVASCULAR: Heart: regular rate and rhythm  GASTROINTESTINAL: Abdomen: soft, bowel sounds active  HEMATOLOGIC: no pathological lymph nodes palpated  MUSCULOSKELETAL: Extremities: no edema, pulse 1+   INTEGUMENT: No unusual rashes or suspicious skin lesions noted. Nails appear normal.  NEUROLOGIC: non-focal exam   MENTAL STATUS: alert and oriented, appropriate affect      ASSESSMENT:  1. 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 Difficulty controlling anger    3. Class 1 obesity due to excess calories with serious comorbidity and body mass index (BMI) of 30.0 to 30.9 in adult    4. Essential hypertension    5. Dyslipidemia        PLAN:  1. His diabetes appears to be doing much better. I will await the results of his fructosamine. I again remind him the importance of eating at the same time every day including his at bedtime snack. This reduces the likelihood of hypoglycemia as well as glycemic variability. 2.  He continues to follow the psychologist for anger management. He is having problems getting the insurance company to pay for this. 3.  His blood pressure is adequate today no adjustments are made. 4.  I again encouraged weight reduction. This can be accomplished writing meals, limiting snacks, and avoiding the consumption of processed carbohydrates.   5.  He remains on a statin in view of his significant increased cardiovascular risk. .  Orders Placed This Encounter    FRUCTOSAMINE         Follow-up and Dispositions    · Return in about 6 weeks (around 7/10/2020).            Milli Griggs MD

## 2020-05-29 NOTE — PROGRESS NOTES
Chief Complaint   Patient presents with    Diabetes     1 month follow up      1. Have you been to the ER, urgent care clinic since your last visit? Hospitalized since your last visit? No    2. Have you seen or consulted any other health care providers outside of the [address] [place] [address] since your last visit? Include any pap smears or colon screening.  No

## 2020-05-30 LAB — FRUCTOSAMINE SERPL-SCNC: 296 UMOL/L (ref 0–285)

## 2020-06-03 DIAGNOSIS — Z79.4 CONTROLLED TYPE 2 DIABETES MELLITUS WITH BOTH EYES AFFECTED BY MODERATE NONPROLIFERATIVE RETINOPATHY WITHOUT MACULAR EDEMA, WITH LONG-TERM CURRENT USE OF INSULIN (HCC): ICD-10-CM

## 2020-06-03 DIAGNOSIS — E11.3393 CONTROLLED TYPE 2 DIABETES MELLITUS WITH BOTH EYES AFFECTED BY MODERATE NONPROLIFERATIVE RETINOPATHY WITHOUT MACULAR EDEMA, WITH LONG-TERM CURRENT USE OF INSULIN (HCC): ICD-10-CM

## 2020-06-03 RX ORDER — METFORMIN HYDROCHLORIDE 500 MG/1
500 TABLET, EXTENDED RELEASE ORAL 2 TIMES DAILY WITH MEALS
Qty: 60 TAB | Refills: 11 | Status: SHIPPED | OUTPATIENT
Start: 2020-06-03 | End: 2020-08-25 | Stop reason: CLARIF

## 2020-07-06 RX ORDER — LISINOPRIL 20 MG/1
TABLET ORAL
Qty: 90 TAB | Refills: 3 | Status: SHIPPED | OUTPATIENT
Start: 2020-07-06 | End: 2021-10-01

## 2020-08-20 ENCOUNTER — OFFICE VISIT (OUTPATIENT)
Dept: INTERNAL MEDICINE CLINIC | Age: 70
End: 2020-08-20
Payer: MEDICARE

## 2020-08-20 VITALS
HEART RATE: 65 BPM | TEMPERATURE: 98.3 F | RESPIRATION RATE: 16 BRPM | DIASTOLIC BLOOD PRESSURE: 90 MMHG | SYSTOLIC BLOOD PRESSURE: 183 MMHG | WEIGHT: 240 LBS | HEIGHT: 70 IN | BODY MASS INDEX: 34.36 KG/M2 | OXYGEN SATURATION: 98 %

## 2020-08-20 DIAGNOSIS — E11.3393 CONTROLLED TYPE 2 DIABETES MELLITUS WITH BOTH EYES AFFECTED BY MODERATE NONPROLIFERATIVE RETINOPATHY WITHOUT MACULAR EDEMA, WITH LONG-TERM CURRENT USE OF INSULIN (HCC): Primary | ICD-10-CM

## 2020-08-20 DIAGNOSIS — E66.09 CLASS 1 OBESITY DUE TO EXCESS CALORIES WITH SERIOUS COMORBIDITY AND BODY MASS INDEX (BMI) OF 30.0 TO 30.9 IN ADULT: ICD-10-CM

## 2020-08-20 DIAGNOSIS — Z79.4 CONTROLLED TYPE 2 DIABETES MELLITUS WITH BOTH EYES AFFECTED BY MODERATE NONPROLIFERATIVE RETINOPATHY WITHOUT MACULAR EDEMA, WITH LONG-TERM CURRENT USE OF INSULIN (HCC): Primary | ICD-10-CM

## 2020-08-20 DIAGNOSIS — I10 ESSENTIAL HYPERTENSION: ICD-10-CM

## 2020-08-20 DIAGNOSIS — R09.89 BRUIT OF RIGHT CAROTID ARTERY: ICD-10-CM

## 2020-08-20 DIAGNOSIS — I87.2 VENOUS INSUFFICIENCY: ICD-10-CM

## 2020-08-20 DIAGNOSIS — E78.5 DYSLIPIDEMIA: ICD-10-CM

## 2020-08-20 PROCEDURE — 99215 OFFICE O/P EST HI 40 MIN: CPT | Performed by: INTERNAL MEDICINE

## 2020-08-20 PROCEDURE — G8417 CALC BMI ABV UP PARAM F/U: HCPCS | Performed by: INTERNAL MEDICINE

## 2020-08-20 PROCEDURE — 3046F HEMOGLOBIN A1C LEVEL >9.0%: CPT | Performed by: INTERNAL MEDICINE

## 2020-08-20 PROCEDURE — G8753 SYS BP > OR = 140: HCPCS | Performed by: INTERNAL MEDICINE

## 2020-08-20 PROCEDURE — 2022F DILAT RTA XM EVC RTNOPTHY: CPT | Performed by: INTERNAL MEDICINE

## 2020-08-20 PROCEDURE — G8755 DIAS BP > OR = 90: HCPCS | Performed by: INTERNAL MEDICINE

## 2020-08-20 PROCEDURE — G8510 SCR DEP NEG, NO PLAN REQD: HCPCS | Performed by: INTERNAL MEDICINE

## 2020-08-20 PROCEDURE — G8536 NO DOC ELDER MAL SCRN: HCPCS | Performed by: INTERNAL MEDICINE

## 2020-08-20 PROCEDURE — 3017F COLORECTAL CA SCREEN DOC REV: CPT | Performed by: INTERNAL MEDICINE

## 2020-08-20 PROCEDURE — 1101F PT FALLS ASSESS-DOCD LE1/YR: CPT | Performed by: INTERNAL MEDICINE

## 2020-08-20 PROCEDURE — G8427 DOCREV CUR MEDS BY ELIG CLIN: HCPCS | Performed by: INTERNAL MEDICINE

## 2020-08-20 NOTE — PROGRESS NOTES
Chief Complaint   Patient presents with    Diabetes     Patient is here for a follow up . 1. Have you been to the ER, urgent care clinic since your last visit? Hospitalized since your last visit? No    2. Have you seen or consulted any other health care providers outside of the [address] [place] [address] since your last visit? Include any pap smears or colon screening.  No

## 2020-08-20 NOTE — PROGRESS NOTES
[address] [facility]  [place] [address]  [address] [address]  Phone:  [phone]  Fax: [phone]       Chief Complaint   Patient presents with    Diabetes     Patient is here for a follow up . Rosibel Sharp SUBJECTIVE:    Page Bojorquez is a 72 y.o. male Comes in for return visit, stating that he has done reasonably well. He brings in a lot of his blood sugars and they are quite erratic. He will have one in the 40s and the next one in the 300 range, indicating that he is not eating in a timely fashion and his caloric intake is not good because he consumes a moderate amount of processed carbohydrates. He is currently taking 35 units of his premixed insulin twice a day, along with his other oral agents. He continues to follow up with the psychologist regarding his anger management. He complains of swelling in his lower extremities, which is orthostatic in nature. He has a past history of primary hypertension and dyslipidemia along with hypothyroidism. Current Outpatient Medications   Medication Sig Dispense Refill    lisinopriL (PRINIVIL, ZESTRIL) 20 mg tablet Take 1 tablet by mouth once daily 90 Tab 3    metFORMIN (GLUCOPHAGE) 500 mg tablet Take 1 Tab by mouth two (2) times daily (with meals). 60 Tab 2    metFORMIN ER (GLUCOPHAGE XR) 500 mg tablet Take 1 Tab by mouth two (2) times daily (with meals). 60 Tab 11    lancets (FreeStyle Lancets) 28 gauge misc Use to test blood sugar three times a day. Dx.e11.9 100 Lancet 11    levothyroxine (SYNTHROID) 100 mcg tablet TAKE 1 TABLET BY MOUTH ONCE DAILY BEFORE BREAKFAST 90 Tab 3    atorvastatin (LIPITOR) 40 mg tablet Take 1 Tab by mouth daily. 30 Tab 11    Insulin Syringe-Needle U-100 0.5 mL 31 gauge x 5/16\" syrg USE TO INJECT INSULIN  TWICE DAILY 70 Syringe 11    insulin NPH/insulin regular (HUMULIN 70/30 U-100 INSULIN) 100 unit/mL (70-30) injection 24 Units by SubCUTAneous route Before breakfast and dinner. Indications: type 2 diabetes mellitus 20 mL 11    Blood-Glucose Meter (FREESTYLE LITE METER) monitoring kit Use to test blood sugar three times a day. Dx.e11.9 1 Kit 0    blood-glucose meter (FREESTYLE LITE METER) by Does Not Apply route.  blood sugar diagnostic (FREESTYLE LITE STRIPS) by In Vitro route.  co-enzyme Q-10 (COQ-10) 100 mg capsule Take 100 mg by mouth daily.  aspirin (AGATHA CHEWABLE ASPIRIN) 81 mg chewable tablet Take 81 mg by mouth daily.  multivit,tx with iron,minerals (COMPLETE MULTIVITAMIN PO) Take 1 Tab by mouth daily.        Past Medical History:   Diagnosis Date    Diabetes ([facility] [address]     Hypertension      Past Surgical History:   Procedure Laterality Date    ABDOMEN SURGERY PROC UNLISTED      hemmoroidectomy    HX COLONOSCOPY  2015    Joe----unremarkable    HX HEENT      laser for retinopathy    HX ORTHOPAEDIC      achilles tendon surgery    HX REFRACTIVE SURGERY       No Known Allergies      REVIEW OF SYSTEMS:  General: negative for - chills or fever  ENT: negative for - headaches, nasal congestion or tinnitus  Respiratory: negative for - cough, hemoptysis, shortness of breath or wheezing  Cardiovascular : negative for - chest pain, edema, palpitations or shortness of breath  Gastrointestinal: negative for - abdominal pain, blood in stools, heartburn or nausea/vomiting  Genito-Urinary: no dysuria, trouble voiding, or hematuria  Musculoskeletal: negative for - gait disturbance, joint pain, joint stiffness or joint swelling  Neurological: no TIA or stroke symptoms  Hematologic: no bruises, no bleeding, no swollen glands  Integument: no lumps, mole changes, nail changes or rash  Endocrine: no malaise/lethargy or unexpected weight changes      Social History     Socioeconomic History    Marital status: UNKNOWN     Spouse name: Not on file    Number of children: 4    Years of education: Not on file    Highest education level: Not on file   Occupational History    Occupation: retired fro [employer] Occupation: retired [employer] after 5 years   Tobacco Use    Smoking status: Former Smoker    Smokeless tobacco: Never Used   Substance and Sexual Activity    Alcohol use: Yes     Alcohol/week: 1.0 standard drinks     Types: 1 Glasses of wine per week    Drug use: No    Sexual activity: Yes     Family History   Problem Relation Age of Onset    Heart Disease Mother        OBJECTIVE:    Visit Vitals  /90   Pulse 65   Temp 98.3 °F (36.8 °C)   Resp 16   Ht 5' 10\" (1.778 m)   Wt 240 lb (108.9 kg)   SpO2 98%   BMI 34.44 kg/m²     CONSTITUTIONAL: well , well nourished, appears age appropriate  EYES: perrla, eom intact  ENMT:moist mucous membranes, pharynx clear  NECK: supple. Thyroid normal,no JVD  RESPIRATORY: Chest: clear to ascultation and percussion   CARDIOVASCULAR: Heart: regular rate and rhythm  GASTROINTESTINAL: Abdomen: soft, bowel sounds active  HEMATOLOGIC: no pathological lymph nodes palpated  MUSCULOSKELETAL: Extremities: 1+ edema distally, pulse 1+   INTEGUMENT: No unusual rashes or suspicious skin lesions noted. Nails appear normal.  NEUROLOGIC: non-focal exam   MENTAL STATUS: alert and oriented, appropriate affect      ASSESSMENT:  1. 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 Essential hypertension    3. Class 1 obesity due to excess calories with serious comorbidity and body mass index (BMI) of 30.0 to 30.9 in adult    4. Dyslipidemia    5. Bruit of right carotid artery    6. Venous insufficiency        PLAN:    1. Hopefully the patient's diabetes is doing well, but I doubt this is the case, primarily because of the wide swings in his blood sugar. This could be a glycemic variability, reflecting the lack of timeliness of eating predominantly. 2. Blood pressure appears to be reasonable today, no adjustments are made.   3. He does need to lose weight. This can be accomplished by eating meals, avoiding snacks and eliminating his consumption of processed carbohydrates. 4. He will continue statin in view of his increased cardiovascular risk. He is in a primary risk prevention mode. 5. He does have subclinical disease with carotid plaquing noted. He has had no CNS symptoms to date. 6.   The edema of his lower extremities reflects condition of venous insufficiency. At this point nothing more need be done. I explained the entity to the patient. .  Orders Placed This Encounter    HEMOGLOBIN A1C WITH EAG         Follow-up and Dispositions    · Return in about 3 months (around 11/20/2020).            Terry Baker MD

## 2020-08-21 LAB
EST. AVERAGE GLUCOSE BLD GHB EST-MCNC: 180 MG/DL
HBA1C MFR BLD: 7.9 % (ref 4.8–5.6)

## 2020-08-25 RX ORDER — METFORMIN HYDROCHLORIDE 500 MG/1
500 TABLET, EXTENDED RELEASE ORAL 2 TIMES DAILY WITH MEALS
Qty: 60 TAB | Refills: 11 | Status: SHIPPED | OUTPATIENT
Start: 2020-08-25 | End: 2020-08-29 | Stop reason: SDUPTHER

## 2020-08-29 RX ORDER — METFORMIN HYDROCHLORIDE 500 MG/1
500 TABLET, EXTENDED RELEASE ORAL 2 TIMES DAILY WITH MEALS
Qty: 180 TAB | Refills: 3 | Status: SHIPPED | OUTPATIENT
Start: 2020-08-29 | End: 2021-06-01 | Stop reason: SDUPTHER

## 2020-11-20 ENCOUNTER — OFFICE VISIT (OUTPATIENT)
Dept: INTERNAL MEDICINE CLINIC | Age: 70
End: 2020-11-20
Payer: MEDICARE

## 2020-11-20 VITALS
OXYGEN SATURATION: 97 % | SYSTOLIC BLOOD PRESSURE: 149 MMHG | DIASTOLIC BLOOD PRESSURE: 82 MMHG | RESPIRATION RATE: 16 BRPM | TEMPERATURE: 97.8 F | HEART RATE: 66 BPM | HEIGHT: 70 IN | WEIGHT: 238.3 LBS | BODY MASS INDEX: 34.12 KG/M2

## 2020-11-20 DIAGNOSIS — M19.90 INFLAMMATORY OSTEOARTHRITIS: ICD-10-CM

## 2020-11-20 DIAGNOSIS — Z00.00 WELLNESS EXAMINATION: ICD-10-CM

## 2020-11-20 DIAGNOSIS — Z13.31 SCREENING FOR DEPRESSION: ICD-10-CM

## 2020-11-20 DIAGNOSIS — E78.5 DYSLIPIDEMIA: ICD-10-CM

## 2020-11-20 DIAGNOSIS — E11.3393 CONTROLLED TYPE 2 DIABETES MELLITUS WITH BOTH EYES AFFECTED BY MODERATE NONPROLIFERATIVE RETINOPATHY WITHOUT MACULAR EDEMA, WITH LONG-TERM CURRENT USE OF INSULIN (HCC): ICD-10-CM

## 2020-11-20 DIAGNOSIS — Z79.4 CONTROLLED TYPE 2 DIABETES MELLITUS WITH BOTH EYES AFFECTED BY MODERATE NONPROLIFERATIVE RETINOPATHY WITHOUT MACULAR EDEMA, WITH LONG-TERM CURRENT USE OF INSULIN (HCC): ICD-10-CM

## 2020-11-20 DIAGNOSIS — I10 ESSENTIAL HYPERTENSION: Primary | ICD-10-CM

## 2020-11-20 DIAGNOSIS — E03.9 ACQUIRED HYPOTHYROIDISM: ICD-10-CM

## 2020-11-20 PROCEDURE — 3017F COLORECTAL CA SCREEN DOC REV: CPT | Performed by: INTERNAL MEDICINE

## 2020-11-20 PROCEDURE — G8754 DIAS BP LESS 90: HCPCS | Performed by: INTERNAL MEDICINE

## 2020-11-20 PROCEDURE — 1101F PT FALLS ASSESS-DOCD LE1/YR: CPT | Performed by: INTERNAL MEDICINE

## 2020-11-20 PROCEDURE — G8432 DEP SCR NOT DOC, RNG: HCPCS | Performed by: INTERNAL MEDICINE

## 2020-11-20 PROCEDURE — G8753 SYS BP > OR = 140: HCPCS | Performed by: INTERNAL MEDICINE

## 2020-11-20 PROCEDURE — G8427 DOCREV CUR MEDS BY ELIG CLIN: HCPCS | Performed by: INTERNAL MEDICINE

## 2020-11-20 PROCEDURE — 2022F DILAT RTA XM EVC RTNOPTHY: CPT | Performed by: INTERNAL MEDICINE

## 2020-11-20 PROCEDURE — 3051F HG A1C>EQUAL 7.0%<8.0%: CPT | Performed by: INTERNAL MEDICINE

## 2020-11-20 PROCEDURE — G0439 PPPS, SUBSEQ VISIT: HCPCS | Performed by: INTERNAL MEDICINE

## 2020-11-20 PROCEDURE — G8417 CALC BMI ABV UP PARAM F/U: HCPCS | Performed by: INTERNAL MEDICINE

## 2020-11-20 PROCEDURE — G8536 NO DOC ELDER MAL SCRN: HCPCS | Performed by: INTERNAL MEDICINE

## 2020-11-20 PROCEDURE — 99215 OFFICE O/P EST HI 40 MIN: CPT | Performed by: INTERNAL MEDICINE

## 2020-11-20 RX ORDER — PREDNISONE 5 MG/1
5 TABLET ORAL DAILY
Qty: 30 TAB | Refills: 2 | Status: SHIPPED | OUTPATIENT
Start: 2020-11-20 | End: 2021-03-18

## 2020-11-20 RX ORDER — ATORVASTATIN CALCIUM 40 MG/1
40 TABLET, FILM COATED ORAL DAILY
Qty: 90 TAB | Refills: 3 | Status: SHIPPED | OUTPATIENT
Start: 2020-11-20 | End: 2021-11-22

## 2020-11-20 NOTE — PROGRESS NOTES
Chief Complaint   Patient presents with   [facility] Annual Wellness Visit         1. Have you been to the ER, urgent care clinic since your last visit? Hospitalized since your last visit? No    2. Have you seen or consulted any other health care providers outside of the [address] [place] [address] since your last visit? Include any pap smears or colon screening.  No

## 2020-11-20 NOTE — PROGRESS NOTES
[address] [facility]  [place] [address]  [address] [place] [address]  Phone:  [phone]  Fax: [phone]       Chief Complaint   Patient presents with   [facility] Wellness Visit   . SUBJECTIVE:    Radha Smith is a 72 y.o. male Comes in for return visit complaining about pain in his hands, to the point where he cannot make a fist in either hand because of the discomfort produced. It has been present now for the last several months and is getting worse. He is progressively gaining weight also. He states his diabetes is indeed doing better. He is currently taking 36 units of his premixed insulin. He has not had any interventional hypoglycemia either. He has a past history of primary hypertension and dyslipidemia. Current Outpatient Medications   Medication Sig Dispense Refill    atorvastatin (LIPITOR) 40 mg tablet Take 1 Tab by mouth daily. 90 Tab 3    predniSONE (DELTASONE) 5 mg tablet Take 1 Tab by mouth daily. 30 Tab 2    insulin NPH/insulin regular (HumuLIN 70/30 U-100 Insulin) 100 unit/mL (70-30) injection 36 Units by SubCUTAneous route Before breakfast and dinner. Indications: type 2 diabetes mellitus 2 Vial 11    metFORMIN ER (GLUCOPHAGE XR) 500 mg tablet Take 1 Tab by mouth two (2) times daily (with meals). 180 Tab 3    lisinopriL (PRINIVIL, ZESTRIL) 20 mg tablet Take 1 tablet by mouth once daily 90 Tab 3    lancets (FreeStyle Lancets) 28 gauge misc Use to test blood sugar three times a day. Dx.e11.9 100 Lancet 11    levothyroxine (SYNTHROID) 100 mcg tablet TAKE 1 TABLET BY MOUTH ONCE DAILY BEFORE BREAKFAST 90 Tab 3    Insulin Syringe-Needle U-100 0.5 mL 31 gauge x 5/16\" syrg USE TO INJECT INSULIN  TWICE DAILY 70 Syringe 11    Blood-Glucose Meter (FREESTYLE LITE METER) monitoring kit Use to test blood sugar three times a day. Dx.e11.9 1 Kit 0    blood-glucose meter (FREESTYLE LITE METER) by Does Not Apply route.       blood sugar diagnostic (FREESTYLE LITE STRIPS) by In Vitro route.  co-enzyme Q-10 (COQ-10) 100 mg capsule Take 100 mg by mouth daily.  aspirin (AGATHA CHEWABLE ASPIRIN) 81 mg chewable tablet Take 81 mg by mouth daily.  multivit,tx with iron,minerals (COMPLETE MULTIVITAMIN PO) Take 1 Tab by mouth daily. Past Medical History:   Diagnosis Date    Diabetes ([facility] [address]     Hypertension      Past Surgical History:   Procedure Laterality Date    ABDOMEN SURGERY PROC UNLISTED      hemmoroidectomy    HX COLONOSCOPY  2015    Joe----unremarkable    HX HEENT      laser for retinopathy    HX ORTHOPAEDIC      achilles tendon surgery    HX REFRACTIVE SURGERY       No Known Allergies      REVIEW OF SYSTEMS:  General: negative for - chills or fever  ENT: negative for - headaches, nasal congestion or tinnitus  Respiratory: negative for - cough, hemoptysis, shortness of breath or wheezing  Cardiovascular : negative for - chest pain, edema, palpitations or shortness of breath  Gastrointestinal: negative for - abdominal pain, blood in stools, heartburn or nausea/vomiting  Genito-Urinary: no dysuria, trouble voiding, or hematuria  Musculoskeletal: negative for - gait disturbance, joint pain, joint stiffness or joint swelling  Neurological: no TIA or stroke symptoms  Hematologic: no bruises, no bleeding, no swollen glands  Integument: no lumps, mole changes, nail changes or rash  Endocrine: no malaise/lethargy or unexpected weight changes      Social History     Socioeconomic History    Marital status: UNKNOWN     Spouse name: Not on file    Number of children: 4    Years of education: Not on file    Highest education level: Not on file   Occupational History    Occupation: retired fro [employer] Occupation: retired [employer] after 5 years   Tobacco Use    Smoking status: Former Smoker    Smokeless tobacco: Never Used   Substance and Sexual Activity    Alcohol use:  Yes     Alcohol/week: 1.0 standard drinks     Types: 1 Glasses of wine per week    Drug use: No    Sexual activity: Yes     Family History   Problem Relation Age of Onset    Heart Disease Mother        OBJECTIVE:    Visit Vitals  BP (!) 149/82   Pulse 66   Temp 97.8 °F (36.6 °C) (Oral)   Resp 16   Ht 5' 10\" (1.778 m)   Wt 238 lb 4.8 oz (108.1 kg)   SpO2 97%   BMI 34.19 kg/m²     CONSTITUTIONAL: well , well nourished, appears age appropriate  EYES: perrla, eom intact  ENMT:moist mucous membranes, pharynx clear  NECK: supple. Thyroid normal  RESPIRATORY: Chest: clear to ascultation and percussion   CARDIOVASCULAR: Heart: regular rate and rhythm  GASTROINTESTINAL: Abdomen: soft, bowel sounds active  HEMATOLOGIC: no pathological lymph nodes palpated  MUSCULOSKELETAL: Extremities: no edema, pulse 1+, pain elicited to range of motion PIP joints of both hands, unable to make a fist because of pain in the PIP joints. INTEGUMENT: No unusual rashes or suspicious skin lesions noted. Nails appear normal.  NEUROLOGIC: non-focal exam   MENTAL STATUS: alert and oriented, appropriate affect      ASSESSMENT:  1. Essential hypertension    2. Dyslipidemia    3. 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 Acquired hypothyroidism    5. Inflammatory osteoarthritis    6. Screening for depression    7. Wellness examination        PLAN:    1. The patient's blood pressure is excellent, no adjustments are made, specifically his blood pressure is 126/70.  2. He will continue statin as prescribed and efficacy and compliance will indeed be assessed today. 3. Hopefully his diabetes is doing well, but I will increase his insulin by 4 units in view of the fact that I am going to place him on prednisone. He will therefore take 40 units daily. 4. He does have a history of hypothyroidism and TSH will be checked to ensure that he is stable.   5. He appears to have inflammatory osteoarthritis. I will treat him with prednisone 5 mg daily. I would like to use 5 mg twice a day, but unfortunately I think this will adversely affect his diabetes. I will see how he fares in the next month or so. .  Orders Placed This Encounter    Depression Screen Annual    HEMOGLOBIN A1C WITH EAG    METABOLIC PANEL, BASIC    TSH 3RD GENERATION    APOLIPOPROTEIN B    atorvastatin (LIPITOR) 40 mg tablet    predniSONE (DELTASONE) 5 mg tablet         Follow-up and Dispositions    · Return in about 4 weeks (around 12/18/2020). Malou Wiggins MD  This is the Subsequent Medicare Annual Wellness Exam, performed 12 months or more after the Initial AWV or the last Subsequent AWV    I have reviewed the patient's medical history in detail and updated the computerized patient record. Depression Risk Factor Screening:     3 most recent PHQ Screens 8/20/2020   Little interest or pleasure in doing things Not at all   Feeling down, depressed, irritable, or hopeless Not at all   Total Score PHQ 2 0       Alcohol Risk Screen   Do you average more than 1 drink per night or more than 7 drinks a week: No    In the past three months have you have had more than 4 drinks containing alcohol on one occasion: No        Functional Ability and Level of Safety:   Hearing: Hearing is good. Activities of Daily Living: The home contains: no safety equipment. Patient does total self care     Ambulation: with no difficulty     Fall Risk:  Fall Risk Assessment, last 12 mths 8/20/2020   Able to walk? Yes   Fall in past 12 months? No     Abuse Screen:  Patient is not abused       Cognitive Screening   Has your family/caregiver stated any concerns about your memory: no     Cognitive Screening: Not applicable    Assessment/Plan   Education and counseling provided:  Are appropriate based on today's review and evaluation    Diagnoses and all orders for this visit:    1.  Essential hypertension  - METABOLIC PANEL, BASIC; Future    2. Dyslipidemia  -     atorvastatin (LIPITOR) 40 mg tablet; Take 1 Tab by mouth daily.  -     APOLIPOPROTEIN B; Future    3. 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 Acquired hypothyroidism  -     TSH 3RD GENERATION; Future    5. Inflammatory osteoarthritis  -     predniSONE (DELTASONE) 5 mg tablet; Take 1 Tab by mouth daily. 6. Screening for depression  -     DEPRESSION SCREEN ANNUAL    7.  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, with long-term current use of insulin ([address] H60.7193, Z79.4    Essential hypertension I10    Dyslipidemia E78.5    Benign prostatic hyperplasia without lower urinary tract symptoms N40.0    Acquired hypothyroidism E03.9    Class 1 obesity due to excess calories with serious comorbidity and body mass index (BMI) of 30.0 to 30.9 in adult E66.09, Z68.30    Bruit of right carotid artery R09.89    Inflammatory osteoarthritis M19.90    Moderate nonproliferative diabetic retinopathy of both eyes associated with type 2 diabetes mellitus ([address] Z84.6241    Type 2 diabetes with nephropathy ([address] E11.21    Difficulty controlling anger R45.4    Venous insufficiency I87.2     Past Medical History:   Diagnosis Date    Diabetes ([address]     Hypertension Past Surgical History:   Procedure Laterality Date    ABDOMEN SURGERY PROC UNLISTED      hemmoroidectomy    HX COLONOSCOPY  2015    Joe----unremarkable    HX HEENT      laser for retinopathy    HX ORTHOPAEDIC      achilles tendon surgery    HX REFRACTIVE SURGERY       Current Outpatient Medications   Medication Sig Dispense Refill    atorvastatin (LIPITOR) 40 mg tablet Take 1 Tab by mouth daily. 90 Tab 3    predniSONE (DELTASONE) 5 mg tablet Take 1 Tab by mouth daily. 30 Tab 2    insulin NPH/insulin regular (HumuLIN 70/30 U-100 Insulin) 100 unit/mL (70-30) injection 36 Units by SubCUTAneous route Before breakfast and dinner. Indications: type 2 diabetes mellitus 2 Vial 11    metFORMIN ER (GLUCOPHAGE XR) 500 mg tablet Take 1 Tab by mouth two (2) times daily (with meals). 180 Tab 3    lisinopriL (PRINIVIL, ZESTRIL) 20 mg tablet Take 1 tablet by mouth once daily 90 Tab 3    lancets (FreeStyle Lancets) 28 gauge misc Use to test blood sugar three times a day. Dx.e11.9 100 Lancet 11    levothyroxine (SYNTHROID) 100 mcg tablet TAKE 1 TABLET BY MOUTH ONCE DAILY BEFORE BREAKFAST 90 Tab 3    Insulin Syringe-Needle U-100 0.5 mL 31 gauge x 5/16\" syrg USE TO INJECT INSULIN  TWICE DAILY 70 Syringe 11    Blood-Glucose Meter (FREESTYLE LITE METER) monitoring kit Use to test blood sugar three times a day. Dx.e11.9 1 Kit 0    blood-glucose meter (FREESTYLE LITE METER) by Does Not Apply route.  blood sugar diagnostic (FREESTYLE LITE STRIPS) by In Vitro route.  co-enzyme Q-10 (COQ-10) 100 mg capsule Take 100 mg by mouth daily.  aspirin (AGATHA CHEWABLE ASPIRIN) 81 mg chewable tablet Take 81 mg by mouth daily.  multivit,tx with iron,minerals (COMPLETE MULTIVITAMIN PO) Take 1 Tab by mouth daily.        No Known Allergies    Family History   Problem Relation Age of Onset    Heart Disease Mother      Social History     Tobacco Use    Smoking status: Former Smoker    Smokeless tobacco: Never Used   Substance Use Topics    Alcohol use:  Yes     Alcohol/week: 1.0 standard drinks     Types: 1 Glasses of wine per week

## 2020-11-21 ENCOUNTER — HOSPITAL ENCOUNTER (OUTPATIENT)
Dept: LAB | Age: 70
Discharge: HOME | End: 2020-11-21

## 2020-11-21 NOTE — PATIENT INSTRUCTIONS
Medicare Wellness Visit, Male The best way to live healthy is to have a lifestyle where you eat a well-balanced diet, exercise regularly, limit alcohol use, and quit all forms of tobacco/nicotine, if applicable. Regular preventive services are another way to keep healthy. Preventive services (vaccines, screening tests, monitoring & exams) can help personalize your care plan, which helps you manage your own care. Screening tests can find health problems at the earliest stages, when they are easiest to treat. [facility] follows the current, evidence-based guidelines published by the [facility] ([facility]) when recommending preventive services for our patients. Because we follow these guidelines, sometimes recommendations change over time as research supports it. (For example, a prostate screening blood test is no longer routinely recommended for men with no symptoms). Of course, you and your doctor may decide to screen more often for some diseases, based on your risk and co-morbidities (chronic disease you are already diagnosed with). Preventive services for you include: - Medicare offers their members a free annual wellness visit, which is time for you and your primary care provider to discuss and plan for your preventive service needs. Take advantage of this benefit every year! 
-All adults over age 72 should receive the recommended pneumonia vaccines. Current USPSTF guidelines recommend a series of two vaccines for the best pneumonia protection.  
-All adults should have a flu vaccine yearly and tetanus vaccine every 10 years. 
-All adults age 48 and older should receive the shingles vaccines (series of two vaccines).       
-All adults age 38-68 who are overweight should have a diabetes screening test once every three years.  
-Other screening tests & preventive services for persons with diabetes include: an eye exam to screen for diabetic retinopathy, a kidney function test, a foot exam, and stricter control over your cholesterol.  
-Cardiovascular screening for adults with routine risk involves an electrocardiogram (ECG) at intervals determined by the provider.  
-Colorectal cancer screening should be done for adults age 54-65 with no increased risk factors for colorectal cancer. There are a number of acceptable methods of screening for this type of cancer. Each test has its own benefits and drawbacks. Discuss with your provider what is most appropriate for you during your annual wellness visit. The different tests include: colonoscopy (considered the best screening method), a fecal occult blood test, a fecal DNA test, and sigmoidoscopy. 
-All adults born between [facility] should be screened once for Hepatitis C. 
-An Abdominal Aortic Aneurysm (AAA) Screening is recommended for men age 73-68 who has ever smoked in their lifetime. Here is a list of your current Health Maintenance items (your personalized list of preventive services) with a due date: 
Health Maintenance Due Topic Date Due  
 Colorectal Screening  09/04/2019  Yearly Flu Vaccine (1) 09/01/2020 [facility] Foot [facility]  12/16/2020  Albumin Urine Test  12/16/2020

## 2020-11-24 LAB
APO B SERPL-MCNC: 80 MG/DL
BUN SERPL-MCNC: 12 MG/DL (ref 8–27)
BUN/CREAT SERPL: 10 (ref 10–24)
CALCIUM SERPL-MCNC: 9.3 MG/DL (ref 8.6–10.2)
CHLORIDE SERPL-SCNC: 106 MMOL/L (ref 96–106)
CO2 SERPL-SCNC: 21 MMOL/L (ref 20–29)
CREAT SERPL-MCNC: 1.24 MG/DL (ref 0.76–1.27)
EST. AVERAGE GLUCOSE BLD GHB EST-MCNC: 171 MG/DL
GLUCOSE SERPL-MCNC: 58 MG/DL (ref 65–99)
HBA1C MFR BLD: 7.6 % (ref 4.8–5.6)
POTASSIUM SERPL-SCNC: 4.3 MMOL/L (ref 3.5–5.2)
SODIUM SERPL-SCNC: 145 MMOL/L (ref 134–144)
TSH SERPL DL<=0.005 MIU/L-ACNC: 2.2 UIU/ML (ref 0.45–4.5)

## 2020-11-29 PROBLEM — R73.02 IMPAIRED GLUCOSE TOLERANCE: Status: ACTIVE | Noted: 2020-11-29

## 2020-12-22 ENCOUNTER — OFFICE VISIT (OUTPATIENT)
Dept: INTERNAL MEDICINE CLINIC | Age: 70
End: 2020-12-22
Payer: MEDICARE

## 2020-12-22 DIAGNOSIS — Z79.4 CONTROLLED TYPE 2 DIABETES MELLITUS WITH BOTH EYES AFFECTED BY MODERATE NONPROLIFERATIVE RETINOPATHY WITHOUT MACULAR EDEMA, WITH LONG-TERM CURRENT USE OF INSULIN (HCC): Primary | ICD-10-CM

## 2020-12-22 DIAGNOSIS — E03.9 ACQUIRED HYPOTHYROIDISM: ICD-10-CM

## 2020-12-22 DIAGNOSIS — E66.9 OBESITY (BMI 30.0-34.9): ICD-10-CM

## 2020-12-22 DIAGNOSIS — E78.5 DYSLIPIDEMIA: ICD-10-CM

## 2020-12-22 DIAGNOSIS — E11.3393 CONTROLLED TYPE 2 DIABETES MELLITUS WITH BOTH EYES AFFECTED BY MODERATE NONPROLIFERATIVE RETINOPATHY WITHOUT MACULAR EDEMA, WITH LONG-TERM CURRENT USE OF INSULIN (HCC): Primary | ICD-10-CM

## 2020-12-22 DIAGNOSIS — I10 ESSENTIAL HYPERTENSION: ICD-10-CM

## 2020-12-22 PROCEDURE — G8432 DEP SCR NOT DOC, RNG: HCPCS | Performed by: INTERNAL MEDICINE

## 2020-12-22 PROCEDURE — G8753 SYS BP > OR = 140: HCPCS | Performed by: INTERNAL MEDICINE

## 2020-12-22 PROCEDURE — G8427 DOCREV CUR MEDS BY ELIG CLIN: HCPCS | Performed by: INTERNAL MEDICINE

## 2020-12-22 PROCEDURE — G8536 NO DOC ELDER MAL SCRN: HCPCS | Performed by: INTERNAL MEDICINE

## 2020-12-22 PROCEDURE — G8754 DIAS BP LESS 90: HCPCS | Performed by: INTERNAL MEDICINE

## 2020-12-22 PROCEDURE — 99215 OFFICE O/P EST HI 40 MIN: CPT | Performed by: INTERNAL MEDICINE

## 2020-12-22 PROCEDURE — 2022F DILAT RTA XM EVC RTNOPTHY: CPT | Performed by: INTERNAL MEDICINE

## 2020-12-22 PROCEDURE — 1101F PT FALLS ASSESS-DOCD LE1/YR: CPT | Performed by: INTERNAL MEDICINE

## 2020-12-22 PROCEDURE — G8417 CALC BMI ABV UP PARAM F/U: HCPCS | Performed by: INTERNAL MEDICINE

## 2020-12-22 PROCEDURE — 3051F HG A1C>EQUAL 7.0%<8.0%: CPT | Performed by: INTERNAL MEDICINE

## 2020-12-22 PROCEDURE — 3017F COLORECTAL CA SCREEN DOC REV: CPT | Performed by: INTERNAL MEDICINE

## 2020-12-22 NOTE — PROGRESS NOTES
Chief Complaint   Patient presents with    Hypertension     1 month follow up            1. Have you been to the ER, urgent care clinic since your last visit? Hospitalized since your last visit? No    2. Have you seen or consulted any other health care providers outside of the [facility] since your last visit? Include any pap smears or colon screening.  No

## 2020-12-27 VITALS
WEIGHT: 238 LBS | HEART RATE: 64 BPM | HEIGHT: 70 IN | OXYGEN SATURATION: 97 % | BODY MASS INDEX: 34.07 KG/M2 | RESPIRATION RATE: 18 BRPM | TEMPERATURE: 98.1 F | SYSTOLIC BLOOD PRESSURE: 122 MMHG | DIASTOLIC BLOOD PRESSURE: 78 MMHG

## 2020-12-27 PROBLEM — E66.9 OBESITY (BMI 30.0-34.9): Status: ACTIVE | Noted: 2020-12-27

## 2020-12-27 NOTE — PROGRESS NOTES
[address] [facility]  [place] [address]  [address] [facility] [address]  Phone:  [phone]  Fax: [phone]       Chief Complaint   Patient presents with    Hypertension     1 month follow up    . SUBJECTIVE:    Jia Cevallos is a 72 y.o. male Comes in for return visit, stating that he has done reasonably well. He states his blood sugars have been excellent. He did not give me any specific numbers, however. He has not had any interventional hypoglycemia either. He continues follow up with his therapist for his anger management. He has gained weight also. This needs to be minimized in view of his dysmetabolic status. He has a past history of primary hypertension, dyslipidemia and hypothyroidism. He is modestly active. This could improve. Current Outpatient Medications   Medication Sig Dispense Refill    insulin syringe-needle U-100 1/2 mL 31 gauge x 15/64\" syrg INJECT INSULIN TWICE DAILY 100 Pen Needle 11    atorvastatin (LIPITOR) 40 mg tablet Take 1 Tab by mouth daily. 90 Tab 3    predniSONE (DELTASONE) 5 mg tablet Take 1 Tab by mouth daily. 30 Tab 2    insulin NPH/insulin regular (HumuLIN 70/30 U-100 Insulin) 100 unit/mL (70-30) injection 36 Units by SubCUTAneous route Before breakfast and dinner. Indications: type 2 diabetes mellitus 2 Vial 11    metFORMIN ER (GLUCOPHAGE XR) 500 mg tablet Take 1 Tab by mouth two (2) times daily (with meals). 180 Tab 3    lisinopriL (PRINIVIL, ZESTRIL) 20 mg tablet Take 1 tablet by mouth once daily 90 Tab 3    lancets (FreeStyle Lancets) 28 gauge misc Use to test blood sugar three times a day. Dx.e11.9 100 Lancet 11    levothyroxine (SYNTHROID) 100 mcg tablet TAKE 1 TABLET BY MOUTH ONCE DAILY BEFORE BREAKFAST 90 Tab 3    Blood-Glucose Meter (FREESTYLE LITE METER) monitoring kit Use to test blood sugar three times a day. Dx.e11.9 1 Kit 0    blood-glucose meter (FREESTYLE LITE METER) by Does Not Apply route.       blood sugar diagnostic (FREESTYLE LITE STRIPS) by In Vitro route.  co-enzyme Q-10 (COQ-10) 100 mg capsule Take 100 mg by mouth daily.  aspirin (AGATHA CHEWABLE ASPIRIN) 81 mg chewable tablet Take 81 mg by mouth daily.  multivit,tx with iron,minerals (COMPLETE MULTIVITAMIN PO) Take 1 Tab by mouth daily. Past Medical History:   Diagnosis Date    Diabetes ([facility] [address]     Hypertension      Past Surgical History:   Procedure Laterality Date    ABDOMEN SURGERY PROC UNLISTED      hemmoroidectomy    HX COLONOSCOPY  2015    Joe----unremarkable    HX HEENT      laser for retinopathy    HX ORTHOPAEDIC      achilles tendon surgery    HX REFRACTIVE SURGERY       No Known Allergies      REVIEW OF SYSTEMS:  General: negative for - chills or fever  ENT: negative for - headaches, nasal congestion or tinnitus  Respiratory: negative for - cough, hemoptysis, shortness of breath or wheezing  Cardiovascular : negative for - chest pain, edema, palpitations or shortness of breath  Gastrointestinal: negative for - abdominal pain, blood in stools, heartburn or nausea/vomiting  Genito-Urinary: no dysuria, trouble voiding, or hematuria  Musculoskeletal: negative for - gait disturbance, joint pain, joint stiffness or joint swelling  Neurological: no TIA or stroke symptoms  Hematologic: no bruises, no bleeding, no swollen glands  Integument: no lumps, mole changes, nail changes or rash  Endocrine: no malaise/lethargy or unexpected weight changes      Social History     Socioeconomic History    Marital status: UNKNOWN     Spouse name: Not on file    Number of children: 4    Years of education: Not on file    Highest education level: Not on file   Occupational History    Occupation: retired fro [employer] Occupation: retired [employer] after 5 years   Tobacco Use    Smoking status: Former Smoker    Smokeless tobacco: Never Used   Substance and Sexual Activity    Alcohol use:  Yes     Alcohol/week: 1.0 standard drinks     Types: 1 Glasses of wine per week    Drug use: No    Sexual activity: Yes     Family History   Problem Relation Age of Onset    Heart Disease Mother        OBJECTIVE:    Visit Vitals  /78   Pulse 64   Temp 98.1 °F (36.7 °C) (Oral)   Resp 18   Ht 5' 10\" (1.778 m)   Wt 238 lb (108 kg)   SpO2 97%   BMI 34.15 kg/m²     CONSTITUTIONAL: well , well nourished, appears age appropriate  EYES: perrla, eom intact  ENMT:moist mucous membranes, pharynx clear  NECK: supple. Thyroid normal  RESPIRATORY: Chest: clear to ascultation and percussion   CARDIOVASCULAR: Heart: regular rate and rhythm  GASTROINTESTINAL: Abdomen: soft, bowel sounds active  HEMATOLOGIC: no pathological lymph nodes palpated  MUSCULOSKELETAL: Extremities: no edema, pulse 1+   INTEGUMENT: No unusual rashes or suspicious skin lesions noted. Nails appear normal.  NEUROLOGIC: non-focal exam   MENTAL STATUS: alert and oriented, appropriate affect      ASSESSMENT:  1. 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 Dyslipidemia    3. Obesity (BMI 30.0-34.9)    4. Essential hypertension    5. Acquired hypothyroidism        PLAN:    1. Hopefully his diabetes is doing reasonably well. Hemoglobin A1c will be checked on his return visit. Previous one was improving nicely. 2. He will continue statin as prescribed in view of his increased cardiovascular risk. He is in a primary risk prevention status. 3. He does need to lose weight. This can be accomplished by eating meals, eliminating snacks and avoiding the consumption of processed carbohydrates. This is especially important in view of his diabetes. 4. Blood pressure is excellent, no adjustments are made. 5. He will continue his thyroid supplement as prescribed and TSH will be checked on his return visit.           Follow-up and Dispositions    · Return in about 2 months (around 2/22/2021).            Malou Wiggins MD

## 2021-02-09 LAB — SARS-COV-2, NAA: NOT DETECTED

## 2021-02-23 ENCOUNTER — OFFICE VISIT (OUTPATIENT)
Dept: INTERNAL MEDICINE CLINIC | Age: 71
End: 2021-02-23
Payer: MEDICARE

## 2021-02-23 VITALS
RESPIRATION RATE: 16 BRPM | HEART RATE: 69 BPM | SYSTOLIC BLOOD PRESSURE: 120 MMHG | OXYGEN SATURATION: 97 % | DIASTOLIC BLOOD PRESSURE: 78 MMHG | WEIGHT: 242 LBS | HEIGHT: 70 IN | BODY MASS INDEX: 34.65 KG/M2 | TEMPERATURE: 97.7 F

## 2021-02-23 DIAGNOSIS — E11.3393 CONTROLLED TYPE 2 DIABETES MELLITUS WITH BOTH EYES AFFECTED BY MODERATE NONPROLIFERATIVE RETINOPATHY WITHOUT MACULAR EDEMA, WITH LONG-TERM CURRENT USE OF INSULIN (HCC): ICD-10-CM

## 2021-02-23 DIAGNOSIS — Z79.4 CONTROLLED TYPE 2 DIABETES MELLITUS WITH BOTH EYES AFFECTED BY MODERATE NONPROLIFERATIVE RETINOPATHY WITHOUT MACULAR EDEMA, WITH LONG-TERM CURRENT USE OF INSULIN (HCC): ICD-10-CM

## 2021-02-23 DIAGNOSIS — E78.5 DYSLIPIDEMIA: ICD-10-CM

## 2021-02-23 DIAGNOSIS — E66.9 OBESITY (BMI 30.0-34.9): ICD-10-CM

## 2021-02-23 DIAGNOSIS — E03.9 ACQUIRED HYPOTHYROIDISM: ICD-10-CM

## 2021-02-23 DIAGNOSIS — I10 ESSENTIAL HYPERTENSION: Primary | ICD-10-CM

## 2021-02-23 DIAGNOSIS — M19.90 INFLAMMATORY OSTEOARTHRITIS: ICD-10-CM

## 2021-02-23 PROCEDURE — 3017F COLORECTAL CA SCREEN DOC REV: CPT | Performed by: INTERNAL MEDICINE

## 2021-02-23 PROCEDURE — G8417 CALC BMI ABV UP PARAM F/U: HCPCS | Performed by: INTERNAL MEDICINE

## 2021-02-23 PROCEDURE — G8752 SYS BP LESS 140: HCPCS | Performed by: INTERNAL MEDICINE

## 2021-02-23 PROCEDURE — 1101F PT FALLS ASSESS-DOCD LE1/YR: CPT | Performed by: INTERNAL MEDICINE

## 2021-02-23 PROCEDURE — G8754 DIAS BP LESS 90: HCPCS | Performed by: INTERNAL MEDICINE

## 2021-02-23 PROCEDURE — 2022F DILAT RTA XM EVC RTNOPTHY: CPT | Performed by: INTERNAL MEDICINE

## 2021-02-23 PROCEDURE — 3051F HG A1C>EQUAL 7.0%<8.0%: CPT | Performed by: INTERNAL MEDICINE

## 2021-02-23 PROCEDURE — G8510 SCR DEP NEG, NO PLAN REQD: HCPCS | Performed by: INTERNAL MEDICINE

## 2021-02-23 PROCEDURE — 99215 OFFICE O/P EST HI 40 MIN: CPT | Performed by: INTERNAL MEDICINE

## 2021-02-23 PROCEDURE — G8427 DOCREV CUR MEDS BY ELIG CLIN: HCPCS | Performed by: INTERNAL MEDICINE

## 2021-02-23 PROCEDURE — G8536 NO DOC ELDER MAL SCRN: HCPCS | Performed by: INTERNAL MEDICINE

## 2021-02-23 NOTE — PROGRESS NOTES
Demond Waters is a 72 y.o. male  Chief Complaint   Patient presents with    Follow-up     2 month     Health Maintenance Due   Topic Date Due    COVID-19 Vaccine (1 of 2) 04/30/1967    DTaP/Tdap/Td series (1 - Tdap) 04/30/1972    Colorectal Cancer Screening Combo  09/04/2019    Foot Exam Q1  12/16/2020    MICROALBUMIN Q1  12/16/2020     Visit Vitals  BP (!) 143/75   Pulse 69   Temp 97.7 °F (36.5 °C) (Oral)   Resp 16   Ht 5' 10\" (1.778 m)   Wt 242 lb (109.8 kg)   SpO2 97%   BMI 34.72 kg/m²     1. Have you been to the ER, urgent care clinic since your last visit? Hospitalized since your last visit? Yes, For a covid test 3 weeks ago. Which was negative    2. Have you seen or consulted any other health care providers outside of the [address] [place] [address] since your last visit? Include any pap smears or colon screening.  No

## 2021-02-23 NOTE — PROGRESS NOTES
[facility]  [place]   [address] [place] [address]  Phone:  [phone]  Fax: [phone]       Chief Complaint   Patient presents with    Follow-up     2 month   . SUBJECTIVE:    Abena Elder is a 72 y.o. male comes in stating that he has done well. He has no complaints for the most part other than gaining weight. He has not worked for the last two to three weeks and admits to dietary indiscretion as well as physical inactivity. 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 He has a past history of primary hypertension, hypothyroidism, and dyslipidemia. Current Outpatient Medications   Medication Sig Dispense Refill    ZINC ACETATE PO Take  by mouth.  B-complex with vitamin C (VITAMIN B COMPLEX-C PO) Take  by mouth.  ascorbic acid (VITAMIN C PO) Take  by mouth.  insulin syringe-needle U-100 1/2 mL 31 gauge x 15/64\" syrg INJECT INSULIN TWICE DAILY 100 Pen Needle 11    atorvastatin (LIPITOR) 40 mg tablet Take 1 Tab by mouth daily. 90 Tab 3    predniSONE (DELTASONE) 5 mg tablet Take 1 Tab by mouth daily. 30 Tab 2    insulin NPH/insulin regular (HumuLIN 70/30 U-100 Insulin) 100 unit/mL (70-30) injection 36 Units by SubCUTAneous route Before breakfast and dinner. Indications: type 2 diabetes mellitus 2 Vial 11    metFORMIN ER (GLUCOPHAGE XR) 500 mg tablet Take 1 Tab by mouth two (2) times daily (with meals). 180 Tab 3    lisinopriL (PRINIVIL, ZESTRIL) 20 mg tablet Take 1 tablet by mouth once daily 90 Tab 3    lancets (FreeStyle Lancets) 28 gauge misc Use to test blood sugar three times a day. Dx.e11.9 100 Lancet 11    levothyroxine (SYNTHROID) 100 mcg tablet TAKE 1 TABLET BY MOUTH ONCE DAILY BEFORE BREAKFAST 90 Tab 3    Blood-Glucose Meter (FREESTYLE LITE METER) monitoring kit Use to test blood sugar three times a day.  Dx.e11.9 1 Kit 0    blood-glucose meter (FREESTYLE LITE METER) by Does Not Apply route.  blood sugar diagnostic (FREESTYLE LITE STRIPS) by In Vitro route.  co-enzyme Q-10 (COQ-10) 100 mg capsule Take 100 mg by mouth daily.  aspirin (AGATHA CHEWABLE ASPIRIN) 81 mg chewable tablet Take 81 mg by mouth daily.  multivit,tx with iron,minerals (COMPLETE MULTIVITAMIN PO) Take 1 Tab by mouth daily.        Past Medical History:   Diagnosis Date    Diabetes ([facility] [address]     Hypertension      Past Surgical History:   Procedure Laterality Date    HX COLONOSCOPY  2015    Joe----unremarkable    HX HEENT      laser for retinopathy    HX ORTHOPAEDIC      achilles tendon surgery    HX REFRACTIVE SURGERY      [place] ABDOMEN SURGERY PROC UNLISTED      hemmoroidectomy     No Known Allergies      REVIEW OF SYSTEMS:  General: negative for - chills or fever  ENT: negative for - headaches, nasal congestion or tinnitus  Respiratory: negative for - cough, hemoptysis, shortness of breath or wheezing  Cardiovascular : negative for - chest pain, edema, palpitations or shortness of breath  Gastrointestinal: negative for - abdominal pain, blood in stools, heartburn or nausea/vomiting  Genito-Urinary: no dysuria, trouble voiding, or hematuria  Musculoskeletal: negative for - gait disturbance, joint pain, joint stiffness or joint swelling  Neurological: no TIA or stroke symptoms  Hematologic: no bruises, no bleeding, no swollen glands  Integument: no lumps, mole changes, nail changes or rash  Endocrine: no malaise/lethargy or unexpected weight changes      Social History     Socioeconomic History    Marital status: UNKNOWN     Spouse name: Not on file    Number of children: 4    Years of education: Not on file    Highest education level: Not on file   Occupational History    Occupation: retired fro [employer] Occupation: retired [employer] after 5 years   Tobacco Use    Smoking status: Former Smoker    Smokeless tobacco: Never Used   Substance and Sexual Activity    Alcohol use: Yes     Alcohol/week: 1.0 standard drinks     Types: 1 Glasses of wine per week    Drug use: No    Sexual activity: Yes     Family History   Problem Relation Age of Onset    Heart Disease Mother        OBJECTIVE:    Visit Vitals  /78   Pulse 69   Temp 97.7 °F (36.5 °C) (Oral)   Resp 16   Ht 5' 10\" (1.778 m)   Wt 242 lb (109.8 kg)   SpO2 97%   BMI 34.72 kg/m²     CONSTITUTIONAL: well , well nourished, appears age appropriate  EYES: perrla, eom intact  ENMT:moist mucous membranes, pharynx clear  NECK: supple. Thyroid normal  RESPIRATORY: Chest: clear to ascultation and percussion   CARDIOVASCULAR: Heart: regular rate and rhythm  GASTROINTESTINAL: Abdomen: soft, bowel sounds active  HEMATOLOGIC: no pathological lymph nodes palpated  MUSCULOSKELETAL: Extremities: no edema, pulse 1+   INTEGUMENT: No unusual rashes or suspicious skin lesions noted. Nails appear normal.  NEUROLOGIC: non-focal exam   MENTAL STATUS: alert and oriented, appropriate affect      ASSESSMENT:  1. Essential hypertension    2. 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 Acquired hypothyroidism    4. Inflammatory osteoarthritis    5. Dyslipidemia    6. Obesity (BMI 30.0-34. 9)        PLAN:  1. The patient's blood pressure is excellent today. No adjustments are made. 2. His diabetes hopefully is doing better, although I suspect it probably will not be as good as it previously had been primarily because of dietary indiscretion. 3. He will continue his thyroid supplement as prescribed. 4. He does have inflammatory osteoarthritis, which is quite responsive to the prednisone 5 mg and I will make no adjustments for now. 5. He will continue statin in view of his increased cardiovascular risk. 6. He needs to lose weight.   This can be accomplished by eating meals, eliminating snacks, and avoiding the consumption of processed carbohydrates. Addendum:  The patient's current dose of insulin is 37 units b.i.d. a.c. breakfast and dinner. .  Orders Placed This Encounter    HEMOGLOBIN A1C WITH EAG    ZINC ACETATE PO    B-complex with vitamin C (VITAMIN B COMPLEX-C PO)    ascorbic acid (VITAMIN C PO)         Follow-up and Dispositions    · Return in about 3 months (around 5/23/2021).            Milli Griggs MD

## 2021-02-24 LAB
EST. AVERAGE GLUCOSE BLD GHB EST-MCNC: 160 MG/DL
HBA1C MFR BLD: 7.2 % (ref 4–5.6)

## 2021-05-24 ENCOUNTER — OFFICE VISIT (OUTPATIENT)
Dept: INTERNAL MEDICINE CLINIC | Age: 71
End: 2021-05-24
Payer: MEDICARE

## 2021-05-24 VITALS
WEIGHT: 238.3 LBS | HEART RATE: 74 BPM | DIASTOLIC BLOOD PRESSURE: 66 MMHG | RESPIRATION RATE: 18 BRPM | SYSTOLIC BLOOD PRESSURE: 131 MMHG | BODY MASS INDEX: 34.12 KG/M2 | TEMPERATURE: 98.3 F | OXYGEN SATURATION: 99 % | HEIGHT: 70 IN

## 2021-05-24 DIAGNOSIS — I87.2 VENOUS INSUFFICIENCY: Primary | ICD-10-CM

## 2021-05-24 DIAGNOSIS — E78.5 DYSLIPIDEMIA: ICD-10-CM

## 2021-05-24 DIAGNOSIS — N40.0 BENIGN PROSTATIC HYPERPLASIA WITHOUT LOWER URINARY TRACT SYMPTOMS: ICD-10-CM

## 2021-05-24 DIAGNOSIS — E11.3393 MODERATE NONPROLIFERATIVE DIABETIC RETINOPATHY OF BOTH EYES ASSOCIATED WITH TYPE 2 DIABETES MELLITUS, MACULAR EDEMA PRESENCE UNSPECIFIED (HCC): ICD-10-CM

## 2021-05-24 DIAGNOSIS — I10 ESSENTIAL HYPERTENSION: ICD-10-CM

## 2021-05-24 DIAGNOSIS — E66.9 OBESITY (BMI 30.0-34.9): ICD-10-CM

## 2021-05-24 DIAGNOSIS — E03.9 ACQUIRED HYPOTHYROIDISM: ICD-10-CM

## 2021-05-24 PROCEDURE — 3051F HG A1C>EQUAL 7.0%<8.0%: CPT | Performed by: INTERNAL MEDICINE

## 2021-05-24 PROCEDURE — G8417 CALC BMI ABV UP PARAM F/U: HCPCS | Performed by: INTERNAL MEDICINE

## 2021-05-24 PROCEDURE — 36415 COLL VENOUS BLD VENIPUNCTURE: CPT | Performed by: INTERNAL MEDICINE

## 2021-05-24 PROCEDURE — G8752 SYS BP LESS 140: HCPCS | Performed by: INTERNAL MEDICINE

## 2021-05-24 PROCEDURE — 99214 OFFICE O/P EST MOD 30 MIN: CPT | Performed by: INTERNAL MEDICINE

## 2021-05-24 PROCEDURE — 3017F COLORECTAL CA SCREEN DOC REV: CPT | Performed by: INTERNAL MEDICINE

## 2021-05-24 PROCEDURE — G8432 DEP SCR NOT DOC, RNG: HCPCS | Performed by: INTERNAL MEDICINE

## 2021-05-24 PROCEDURE — 2022F DILAT RTA XM EVC RTNOPTHY: CPT | Performed by: INTERNAL MEDICINE

## 2021-05-24 PROCEDURE — G8536 NO DOC ELDER MAL SCRN: HCPCS | Performed by: INTERNAL MEDICINE

## 2021-05-24 PROCEDURE — G8754 DIAS BP LESS 90: HCPCS | Performed by: INTERNAL MEDICINE

## 2021-05-24 PROCEDURE — G8427 DOCREV CUR MEDS BY ELIG CLIN: HCPCS | Performed by: INTERNAL MEDICINE

## 2021-05-24 PROCEDURE — 1101F PT FALLS ASSESS-DOCD LE1/YR: CPT | Performed by: INTERNAL MEDICINE

## 2021-05-24 RX ORDER — BRIMONIDINE TARTRATE 2 MG/ML
SOLUTION/ DROPS OPHTHALMIC
COMMUNITY
Start: 2021-03-17

## 2021-05-24 RX ORDER — LATANOPROST 50 UG/ML
SOLUTION/ DROPS OPHTHALMIC
COMMUNITY
Start: 2021-05-02
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
## 2021-05-26 LAB
EST. AVERAGE GLUCOSE BLD GHB EST-MCNC: 160 MG/DL
HBA1C MFR BLD: 7.2 % (ref 4.8–5.6)
SPECIMEN STATUS REPORT, ROLRST: NORMAL

## 2021-05-27 LAB
ALBUMIN/CREAT UR: 6 MG/G CREAT (ref 0–29)
APPEARANCE UR: CLEAR
BASOPHILS # BLD AUTO: 0 X10E3/UL (ref 0–0.2)
BASOPHILS NFR BLD AUTO: 1 %
BILIRUB UR QL STRIP: NEGATIVE
COLOR UR: YELLOW
CREAT UR-MCNC: 335.9 MG/DL
EOSINOPHIL # BLD AUTO: 0 X10E3/UL (ref 0–0.4)
EOSINOPHIL NFR BLD AUTO: 0 %
ERYTHROCYTE [DISTWIDTH] IN BLOOD BY AUTOMATED COUNT: 12.8 % (ref 11.6–15.4)
GLUCOSE UR QL: ABNORMAL
HCT VFR BLD AUTO: 45.1 % (ref 37.5–51)
HGB BLD-MCNC: 14.8 G/DL (ref 13–17.7)
HGB UR QL STRIP: NEGATIVE
IMM GRANULOCYTES # BLD AUTO: 0 X10E3/UL (ref 0–0.1)
IMM GRANULOCYTES NFR BLD AUTO: 0 %
KETONES UR QL STRIP: NEGATIVE
LEUKOCYTE ESTERASE UR QL STRIP: NEGATIVE
LYMPHOCYTES # BLD AUTO: 1.2 X10E3/UL (ref 0.7–3.1)
LYMPHOCYTES NFR BLD AUTO: 21 %
MCH RBC QN AUTO: 29.5 PG (ref 26.6–33)
MCHC RBC AUTO-ENTMCNC: 32.8 G/DL (ref 31.5–35.7)
MCV RBC AUTO: 90 FL (ref 79–97)
MICRO URNS: ABNORMAL
MICROALBUMIN UR-MCNC: 21.1 UG/ML
MONOCYTES # BLD AUTO: 0.4 X10E3/UL (ref 0.1–0.9)
MONOCYTES NFR BLD AUTO: 7 %
NEUTROPHILS # BLD AUTO: 4.2 X10E3/UL (ref 1.4–7)
NEUTROPHILS NFR BLD AUTO: 71 %
NITRITE UR QL STRIP: NEGATIVE
PH UR STRIP: 5 [PH] (ref 5–7.5)
PLATELET # BLD AUTO: 247 X10E3/UL (ref 150–450)
PROT UR QL STRIP: NEGATIVE
RBC # BLD AUTO: 5.02 X10E6/UL (ref 4.14–5.8)
SP GR UR: >=1.03 (ref 1–1.03)
UROBILINOGEN UR STRIP-MCNC: 0.2 MG/DL (ref 0.2–1)
WBC # BLD AUTO: 5.9 X10E3/UL (ref 3.4–10.8)

## 2021-05-30 NOTE — PROGRESS NOTES
[facility]  [place] [address]  [address] [place] [id number]  Phone:  [phone]  Fax: [phone]       Chief Complaint   Patient presents with    Hypertension     3 month follow up    . SUBJECTIVE:    Kylah Cruz is a 80 y.o. male comes in return visit stating that he is doing well. Unfortunately, he has not lost any weight and if anything he is gradually gaining. His diabetes has been excellent. He has not had any interventional hypoglycemia. He has had some intermittent low back pain. He has also noticed slight increased urinary frequency but he attributes that to his increased p.o. intake. He needs to see an ophthalmologist in view of his mild apparent diabetic retinopathy. Finally, he has had intermittent headaches, but this does not interfere with his overall activity or require intervention pharmacologically. He has a past history of primary hypertension as well as dyslipidemia. Current Outpatient Medications   Medication Sig Dispense Refill    brimonidine (ALPHAGAN) 0.2 % ophthalmic solution INSTILL 1 DROP INTO RIGHT EYE TWICE DAILY      latanoprost (XALATAN) 0.005 % ophthalmic solution INSTILL 1 DROP INTO RIGHT EYE ONCE DAILY AT BEDTIME      Euthyrox 100 mcg tablet TAKE 1 TABLET BY MOUTH ONCE DAILY BEFORE BREAKFAST 90 Tablet 3    lancets (FreeStyle Lancets) 28 gauge misc USE 1  TO CHECK GLUCOSE THREE TIMES DAILY 100 Lancet 11    predniSONE (DELTASONE) 5 mg tablet Take 1 tablet by mouth once daily 30 Tab 11    ZINC ACETATE PO Take  by mouth.  B-complex with vitamin C (VITAMIN B COMPLEX-C PO) Take  by mouth.  ascorbic acid (VITAMIN C PO) Take  by mouth.  insulin syringe-needle U-100 1/2 mL 31 gauge x 15/64\" syrg INJECT INSULIN TWICE DAILY 100 Pen Needle 11    atorvastatin (LIPITOR) 40 mg tablet Take 1 Tab by mouth daily.  90 Tab 3    insulin NPH/insulin regular (HumuLIN 70/30 U-100 Insulin) 100 unit/mL (70-30) injection 36 Units by SubCUTAneous route Before breakfast and dinner. Indications: type 2 diabetes mellitus 2 Vial 11    metFORMIN ER (GLUCOPHAGE XR) 500 mg tablet Take 1 Tab by mouth two (2) times daily (with meals). 180 Tab 3    lisinopriL (PRINIVIL, ZESTRIL) 20 mg tablet Take 1 tablet by mouth once daily 90 Tab 3    Blood-Glucose Meter (FREESTYLE LITE METER) monitoring kit Use to test blood sugar three times a day. Dx.e11.9 1 Kit 0    blood-glucose meter (FREESTYLE LITE METER) by Does Not Apply route.  blood sugar diagnostic (FREESTYLE LITE STRIPS) by In Vitro route.  co-enzyme Q-10 (COQ-10) 100 mg capsule Take 100 mg by mouth daily.  aspirin (AGATHA CHEWABLE ASPIRIN) 81 mg chewable tablet Take 81 mg by mouth daily.  multivit,tx with iron,minerals (COMPLETE MULTIVITAMIN PO) Take 1 Tab by mouth daily.        Past Medical History:   Diagnosis Date    Diabetes ([facility] [address]     Hypertension      Past Surgical History:   Procedure Laterality Date    HX COLONOSCOPY  2015    Joe----unremarkable    HX HEENT      laser for retinopathy    HX ORTHOPAEDIC      achilles tendon surgery    HX REFRACTIVE SURGERY      [place] ABDOMEN SURGERY PROC UNLISTED      hemmoroidectomy     No Known Allergies      REVIEW OF SYSTEMS:  General: negative for - chills or fever  ENT: negative for - headaches, nasal congestion or tinnitus  Respiratory: negative for - cough, hemoptysis, shortness of breath or wheezing  Cardiovascular : negative for - chest pain, edema, palpitations or shortness of breath  Gastrointestinal: negative for - abdominal pain, blood in stools, heartburn or nausea/vomiting  Genito-Urinary: no dysuria, trouble voiding, or hematuria  Musculoskeletal: negative for - gait disturbance, joint pain, joint stiffness or joint swelling  Neurological: no TIA or stroke symptoms  Hematologic: no bruises, no bleeding, no swollen glands  Integument: no lumps, mole changes, nail changes or rash  Endocrine: no malaise/lethargy or unexpected weight changes      Social History     Socioeconomic History    Marital status: UNKNOWN     Spouse name: Not on file    Number of children: 3    Years of education: Not on file    Highest education level: Not on file   Occupational History    Occupation: retired fro [employer] Occupation: retired [employer] after 5 years   Tobacco Use    Smoking status: Former Smoker    Smokeless tobacco: Never Used   Vaping Use    Vaping Use: Never used   Substance and Sexual Activity    Alcohol use: Yes     Alcohol/week: 1.0 standard drinks     Types: 1 Glasses of wine per week    Drug use: No    Sexual activity: Yes     Social Determinants of Health     Financial Resource Strain:     Difficulty of Paying Living Expenses:    Food Insecurity:     Worried About Running Out of Food in the Last Year:     [address] in the Last Year:    Transportation Needs:     Lack of Transportation (Medical):  Lack of Transportation (Non-Medical):    Physical Activity:     Days of Exercise per Week:     Minutes of Exercise per Session:    Stress:     Feeling of Stress :    Social Connections:     Frequency of Communication with Friends and Family:     Frequency of Social Gatherings with Friends and Family:     Attends Yarsanism Services:     Active Member of Clubs or Organizations:     Attends Club or Organization Meetings:     Marital Status:      Family History   Problem Relation Age of Onset    Heart Disease Mother        OBJECTIVE:    Visit Vitals  /66   Pulse 74   Temp 98.3 °F (36.8 °C) (Oral)   Resp 18   Ht 5' 10\" (1.778 m)   Wt 238 lb 4.8 oz (108.1 kg)   SpO2 99%   BMI 34.19 kg/m²     CONSTITUTIONAL: well , well nourished, appears age appropriate  EYES: perrla, eom intact  ENMT:moist mucous membranes, pharynx clear  NECK: supple.  Thyroid normal  RESPIRATORY: Chest: clear to ascultation and percussion   CARDIOVASCULAR: Heart: regular rate and rhythm  GASTROINTESTINAL: Abdomen: soft, bowel sounds active  HEMATOLOGIC: no pathological lymph nodes palpated  MUSCULOSKELETAL: Extremities: trace edema distally, pulse 1+   INTEGUMENT: No unusual rashes or suspicious skin lesions noted. Nails appear normal.  NEUROLOGIC: non-focal exam   MENTAL STATUS: alert and oriented, appropriate affect      ASSESSMENT:  1. Venous insufficiency    2. Essential hypertension    3. Moderate nonproliferative diabetic retinopathy of both eyes associated with type 2 diabetes mellitus, macular edema presence unspecified ([address]    4. Acquired hypothyroidism    5. Obesity (BMI 30.0-34.9)    6. Dyslipidemia    7. Benign prostatic hyperplasia without lower urinary tract symptoms        PLAN:  1. The patient has mild venous insufficiency and I explained the entity to the patient. Compression stockings are only thing that needs to be done. 2. His blood pressures are excellent. No adjustments are made. 3. He needs to follow with Ophthalmology for his nonproliferative diabetic retinopathy. He has diabetes. I will await the results of his hemoglobin A1c  4. He also has a history of hypothyroidism. His last TSH was excellent. 5. I encouraged weight reduction. This can be accomplished by eating meals, eliminating snacks, and avoiding the consumption of processed carbohydrates. 6. He remains on his statin. His last particle count was excellent. 7. I encouraged him to increase his physical activity on a more consistent basis. .  Orders Placed This Encounter    HEMOGLOBIN A1C WITH EAG    MICROALBUMIN, UR, RAND W/ MICROALB/CREAT RATIO    CBC WITH AUTOMATED DIFF    URINALYSIS W/ RFLX MICROSCOPIC    SPECIMEN STATUS REPORT    brimonidine (ALPHAGAN) 0.2 % ophthalmic solution    latanoprost (XALATAN) 0.005 % ophthalmic solution         Follow-up and Dispositions    · Return in about 3 months (around 8/24/2021).            Flory Wang MD

## 2021-06-01 RX ORDER — METFORMIN HYDROCHLORIDE 500 MG/1
500 TABLET, EXTENDED RELEASE ORAL 2 TIMES DAILY WITH MEALS
Qty: 180 TABLET | Refills: 3 | Status: SHIPPED | OUTPATIENT
Start: 2021-06-01 | End: 2022-02-24 | Stop reason: SDUPTHER

## 2021-06-02 LAB — SPECIMEN STATUS REPORT, ROLRST: NORMAL

## 2021-08-24 ENCOUNTER — OFFICE VISIT (OUTPATIENT)
Dept: INTERNAL MEDICINE CLINIC | Age: 71
End: 2021-08-24
Payer: MEDICARE

## 2021-08-24 VITALS
HEIGHT: 70 IN | OXYGEN SATURATION: 97 % | HEART RATE: 61 BPM | TEMPERATURE: 97.4 F | DIASTOLIC BLOOD PRESSURE: 76 MMHG | SYSTOLIC BLOOD PRESSURE: 127 MMHG | RESPIRATION RATE: 16 BRPM | BODY MASS INDEX: 34.53 KG/M2 | WEIGHT: 241.2 LBS

## 2021-08-24 DIAGNOSIS — I87.2 VENOUS INSUFFICIENCY: ICD-10-CM

## 2021-08-24 DIAGNOSIS — E78.5 DYSLIPIDEMIA: ICD-10-CM

## 2021-08-24 DIAGNOSIS — N40.0 BENIGN PROSTATIC HYPERPLASIA WITHOUT LOWER URINARY TRACT SYMPTOMS: ICD-10-CM

## 2021-08-24 DIAGNOSIS — E11.21 TYPE 2 DIABETES WITH NEPHROPATHY (HCC): ICD-10-CM

## 2021-08-24 DIAGNOSIS — I10 ESSENTIAL HYPERTENSION: Primary | ICD-10-CM

## 2021-08-24 DIAGNOSIS — E66.9 OBESITY (BMI 30.0-34.9): ICD-10-CM

## 2021-08-24 PROCEDURE — 1101F PT FALLS ASSESS-DOCD LE1/YR: CPT | Performed by: INTERNAL MEDICINE

## 2021-08-24 PROCEDURE — 3051F HG A1C>EQUAL 7.0%<8.0%: CPT | Performed by: INTERNAL MEDICINE

## 2021-08-24 PROCEDURE — 2022F DILAT RTA XM EVC RTNOPTHY: CPT | Performed by: INTERNAL MEDICINE

## 2021-08-24 PROCEDURE — G8752 SYS BP LESS 140: HCPCS | Performed by: INTERNAL MEDICINE

## 2021-08-24 PROCEDURE — 3017F COLORECTAL CA SCREEN DOC REV: CPT | Performed by: INTERNAL MEDICINE

## 2021-08-24 PROCEDURE — G8417 CALC BMI ABV UP PARAM F/U: HCPCS | Performed by: INTERNAL MEDICINE

## 2021-08-24 PROCEDURE — G8427 DOCREV CUR MEDS BY ELIG CLIN: HCPCS | Performed by: INTERNAL MEDICINE

## 2021-08-24 PROCEDURE — G8510 SCR DEP NEG, NO PLAN REQD: HCPCS | Performed by: INTERNAL MEDICINE

## 2021-08-24 PROCEDURE — G8754 DIAS BP LESS 90: HCPCS | Performed by: INTERNAL MEDICINE

## 2021-08-24 PROCEDURE — 99214 OFFICE O/P EST MOD 30 MIN: CPT | Performed by: INTERNAL MEDICINE

## 2021-08-24 PROCEDURE — G8536 NO DOC ELDER MAL SCRN: HCPCS | Performed by: INTERNAL MEDICINE

## 2021-08-24 NOTE — PROGRESS NOTES
Chief Complaint   Patient presents with    Hypertension     3 month follow up          1. Have you been to the ER, urgent care clinic since your last visit? Hospitalized since your last visit? No    2. Have you seen or consulted any other health care providers outside of the [address] [place] [address] since your last visit? Include any pap smears or colon screening.  No

## 2021-08-24 NOTE — PROGRESS NOTES
[facility]  [place] [address]  [address] [place] [address]  Phone:  [phone]  Fax: [phone]       Chief Complaint   Patient presents with    Hypertension     3 month follow up    . SUBJECTIVE:    Munir Reddy is a 80 y.o. male comes in for return visit stating that he has done well. Blood sugars have been excellent. He has not had any interventional hypoglycemia. Unfortunately he is gaining weight. This is related to snacking most likely. He has a past history of primary hypertension, dyslipidemia, as well as obesity. He is moderately active. Current Outpatient Medications   Medication Sig Dispense Refill    insulin NPH/insulin regular (HumuLIN 70/30 U-100 Insulin) 100 unit/mL (70-30) injection 36 units before breakfast and dinner 3 Vial 11    metFORMIN ER (GLUCOPHAGE XR) 500 mg tablet Take 1 Tablet by mouth two (2) times daily (with meals). 180 Tablet 3    brimonidine (ALPHAGAN) 0.2 % ophthalmic solution INSTILL 1 DROP INTO RIGHT EYE TWICE DAILY      latanoprost (XALATAN) 0.005 % ophthalmic solution INSTILL 1 DROP INTO RIGHT EYE ONCE DAILY AT BEDTIME      Euthyrox 100 mcg tablet TAKE 1 TABLET BY MOUTH ONCE DAILY BEFORE BREAKFAST 90 Tablet 3    lancets (FreeStyle Lancets) 28 gauge misc USE 1  TO CHECK GLUCOSE THREE TIMES DAILY 100 Lancet 11    predniSONE (DELTASONE) 5 mg tablet Take 1 tablet by mouth once daily 30 Tab 11    insulin syringe-needle U-100 1/2 mL 31 gauge x 15/64\" syrg INJECT INSULIN TWICE DAILY 100 Pen Needle 11    atorvastatin (LIPITOR) 40 mg tablet Take 1 Tab by mouth daily. 90 Tab 3    lisinopriL (PRINIVIL, ZESTRIL) 20 mg tablet Take 1 tablet by mouth once daily 90 Tab 3    Blood-Glucose Meter (FREESTYLE LITE METER) monitoring kit Use to test blood sugar three times a day. Dx.e11.9 1 Kit 0    blood-glucose meter (FREESTYLE LITE METER) by Does Not Apply route.       blood sugar diagnostic (FREESTYLE LITE STRIPS) by In Vitro route.  co-enzyme Q-10 (COQ-10) 100 mg capsule Take 100 mg by mouth daily.  aspirin (AGATHA CHEWABLE ASPIRIN) 81 mg chewable tablet Take 81 mg by mouth daily.  multivit,tx with iron,minerals (COMPLETE MULTIVITAMIN PO) Take 1 Tab by mouth daily. Past Medical History:   Diagnosis Date    Diabetes [address] [address]     Hypertension      Past Surgical History:   Procedure Laterality Date    HX COLONOSCOPY  2015    Joe----unremarkable    HX HEENT      laser for retinopathy    HX ORTHOPAEDIC      achilles tendon surgery    HX REFRACTIVE SURGERY      UT ABDOMEN SURGERY PROC UNLISTED      hemmoroidectomy     No Known Allergies      REVIEW OF SYSTEMS:  General: negative for - chills or fever  ENT: negative for - headaches, nasal congestion or tinnitus  Respiratory: negative for - cough, hemoptysis, shortness of breath or wheezing  Cardiovascular : negative for - chest pain, edema, palpitations or shortness of breath  Gastrointestinal: negative for - abdominal pain, blood in stools, heartburn or nausea/vomiting  Genito-Urinary: no dysuria, trouble voiding, or hematuria  Musculoskeletal: negative for - gait disturbance, joint pain, joint stiffness or joint swelling  Neurological: no TIA or stroke symptoms  Hematologic: no bruises, no bleeding, no swollen glands  Integument: no lumps, mole changes, nail changes or rash  Endocrine: no malaise/lethargy or unexpected weight changes      Social History     Socioeconomic History    Marital status: UNKNOWN     Spouse name: Not on file    Number of children: 4    Years of education: Not on file    Highest education level: Not on file   Occupational History    Occupation: retired fro [employer] Occupation: retired [employer] after 5 years   Tobacco Use    Smoking status: Former Smoker    Smokeless tobacco: Never Used   Vaping Use    Vaping Use: Never used   Substance and Sexual Activity    Alcohol use:  Yes     Alcohol/week: 1.0 standard drinks Types: 1 Glasses of wine per week    Drug use: No    Sexual activity: Yes     Social Determinants of Health     Financial Resource Strain:     Difficulty of Paying Living Expenses:    Food Insecurity:     Worried About Running Out of Food in the Last Year:     [address] in the Last Year:    Transportation Needs:     Lack of Transportation (Medical):  Lack of Transportation (Non-Medical):    Physical Activity:     Days of Exercise per Week:     Minutes of Exercise per Session:    Stress:     Feeling of Stress :    Social Connections:     Frequency of Communication with Friends and Family:     Frequency of Social Gatherings with Friends and Family:     Attends [employer] Services:     Active Member of Clubs or Organizations:     Attends Club or Organization Meetings:     Marital Status:      Family History   Problem Relation Age of Onset    Heart Disease Mother        OBJECTIVE:    Visit Vitals  /76   Pulse 61   Temp 97.4 °F (36.3 °C) (Oral)   Resp 16   Ht 5' 10\" (1.778 m)   Wt 241 lb 3.2 oz (109.4 kg)   SpO2 97%   BMI 34.61 kg/m²     CONSTITUTIONAL: well , well nourished, appears age appropriate  EYES: perrla, eom intact  ENMT:moist mucous membranes, pharynx clear  NECK: supple. Thyroid normal  RESPIRATORY: Chest: clear to ascultation and percussion   CARDIOVASCULAR: Heart: regular rate and rhythm  GASTROINTESTINAL: Abdomen: soft, bowel sounds active  HEMATOLOGIC: no pathological lymph nodes palpated  MUSCULOSKELETAL: Extremities: no edema, pulse 1+   INTEGUMENT: No unusual rashes or suspicious skin lesions noted. Nails appear normal.  NEUROLOGIC: non-focal exam   MENTAL STATUS: alert and oriented, appropriate affect      ASSESSMENT:  1. Essential hypertension    2. Venous insufficiency    3. Type 2 diabetes with nephropathy ([address]    4. Benign prostatic hyperplasia without lower urinary tract symptoms    5. Obesity (BMI 30.0-34.9)    6. Dyslipidemia        PLAN:  1.  The patient's blood pressure is reasonable today, no adjustments are made. 2. He has swelling of his lower extremities, which is actually somewhat worse now as would be expected because this is related to venous insufficiency. This is aggravated obviously by the hot humid weather currently. 3. His diabetes hopefully is doing reasonably well, but I will await the results of his hemoglobin A1c.  4. There is a suggestion he might have early nephropathy based on a mild degree of proteinuria, but fortunately for now this has not been progressive. 5. I encouraged weight reduction. This can be accomplished by eating meals, eliminating snacks, and avoiding the consumption of processed carbohydrates. 6. He does have an increased cardiovascular risk and remains on his statin. Efficacy and compliance will be assessed. Orders Placed This Encounter    HEMOGLOBIN A1C WITH EAG    MICROALBUMIN, UR, RAND W/ MICROALB/CREAT RATIO    APOLIPOPROTEIN B    CBC WITH AUTOMATED DIFF    CRP, HIGH SENSITIVITY    LIPID PANEL    METABOLIC PANEL, COMPREHENSIVE    PROSTATE SPECIFIC AG    URINALYSIS W/ RFLX MICROSCOPIC         Follow-up and Dispositions    · Return in about 3 months (around 11/24/2021).            Andrea Donohue MD

## 2021-08-25 LAB
ALBUMIN SERPL-MCNC: 4 G/DL (ref 3.8–4.8)
ALBUMIN/CREAT UR: <2 MG/G CREAT (ref 0–29)
ALBUMIN/GLOB SERPL: 1.7 {RATIO} (ref 1.2–2.2)
ALP SERPL-CCNC: 122 IU/L (ref 48–121)
ALT SERPL-CCNC: 15 IU/L (ref 0–44)
APO B SERPL-MCNC: 73 MG/DL
APPEARANCE UR: CLEAR
AST SERPL-CCNC: 18 IU/L (ref 0–40)
BASOPHILS # BLD AUTO: 0 X10E3/UL (ref 0–0.2)
BASOPHILS NFR BLD AUTO: 1 %
BILIRUB SERPL-MCNC: 0.4 MG/DL (ref 0–1.2)
BILIRUB UR QL STRIP: NEGATIVE
BUN SERPL-MCNC: 10 MG/DL (ref 8–27)
BUN/CREAT SERPL: 9 (ref 10–24)
CALCIUM SERPL-MCNC: 9.6 MG/DL (ref 8.6–10.2)
CHLORIDE SERPL-SCNC: 106 MMOL/L (ref 96–106)
CHOLEST SERPL-MCNC: 125 MG/DL (ref 100–199)
CO2 SERPL-SCNC: 27 MMOL/L (ref 20–29)
COLOR UR: YELLOW
CREAT SERPL-MCNC: 1.08 MG/DL (ref 0.76–1.27)
CREAT UR-MCNC: 148.9 MG/DL
CRP SERPL HS-MCNC: 0.58 MG/L (ref 0–3)
EOSINOPHIL # BLD AUTO: 0.1 X10E3/UL (ref 0–0.4)
EOSINOPHIL NFR BLD AUTO: 1 %
ERYTHROCYTE [DISTWIDTH] IN BLOOD BY AUTOMATED COUNT: 12.3 % (ref 11.6–15.4)
EST. AVERAGE GLUCOSE BLD GHB EST-MCNC: 180 MG/DL
GLOBULIN SER CALC-MCNC: 2.4 G/DL (ref 1.5–4.5)
GLUCOSE SERPL-MCNC: 43 MG/DL (ref 65–99)
GLUCOSE UR QL: NEGATIVE
HBA1C MFR BLD: 7.9 % (ref 4.8–5.6)
HCT VFR BLD AUTO: 46.4 % (ref 37.5–51)
HDLC SERPL-MCNC: 44 MG/DL
HGB BLD-MCNC: 15.7 G/DL (ref 13–17.7)
HGB UR QL STRIP: NEGATIVE
IMM GRANULOCYTES # BLD AUTO: 0 X10E3/UL (ref 0–0.1)
IMM GRANULOCYTES NFR BLD AUTO: 0 %
KETONES UR QL STRIP: NEGATIVE
LDLC SERPL CALC-MCNC: 68 MG/DL (ref 0–99)
LEUKOCYTE ESTERASE UR QL STRIP: NEGATIVE
LYMPHOCYTES # BLD AUTO: 1.3 X10E3/UL (ref 0.7–3.1)
LYMPHOCYTES NFR BLD AUTO: 26 %
MCH RBC QN AUTO: 30 PG (ref 26.6–33)
MCHC RBC AUTO-ENTMCNC: 33.8 G/DL (ref 31.5–35.7)
MCV RBC AUTO: 89 FL (ref 79–97)
MICRO URNS: ABNORMAL
MICROALBUMIN UR-MCNC: <3 UG/ML
MONOCYTES # BLD AUTO: 0.3 X10E3/UL (ref 0.1–0.9)
MONOCYTES NFR BLD AUTO: 6 %
NEUTROPHILS # BLD AUTO: 3.5 X10E3/UL (ref 1.4–7)
NEUTROPHILS NFR BLD AUTO: 66 %
NITRITE UR QL STRIP: NEGATIVE
PH UR STRIP: 8 [PH] (ref 5–7.5)
PLATELET # BLD AUTO: 274 X10E3/UL (ref 150–450)
POTASSIUM SERPL-SCNC: 4.6 MMOL/L (ref 3.5–5.2)
PROT SERPL-MCNC: 6.4 G/DL (ref 6–8.5)
PROT UR QL STRIP: NEGATIVE
PSA SERPL-MCNC: 0.4 NG/ML (ref 0–4)
RBC # BLD AUTO: 5.24 X10E6/UL (ref 4.14–5.8)
SODIUM SERPL-SCNC: 143 MMOL/L (ref 134–144)
SP GR UR: 1.02 (ref 1–1.03)
TRIGL SERPL-MCNC: 61 MG/DL (ref 0–149)
UROBILINOGEN UR STRIP-MCNC: 0.2 MG/DL (ref 0.2–1)
VLDLC SERPL CALC-MCNC: 13 MG/DL (ref 5–40)
WBC # BLD AUTO: 5.2 X10E3/UL (ref 3.4–10.8)

## 2021-09-22 RX ORDER — LEVOTHYROXINE SODIUM 100 UG/1
100 TABLET ORAL
Qty: 90 TABLET | Refills: 3 | Status: SHIPPED | OUTPATIENT
Start: 2021-09-22 | End: 2021-12-27 | Stop reason: SDUPTHER

## 2021-10-06 ENCOUNTER — OFFICE VISIT (OUTPATIENT)
Dept: INTERNAL MEDICINE CLINIC | Age: 71
End: 2021-10-06
Payer: MEDICARE

## 2021-10-06 VITALS
SYSTOLIC BLOOD PRESSURE: 153 MMHG | WEIGHT: 239.7 LBS | HEIGHT: 70 IN | DIASTOLIC BLOOD PRESSURE: 73 MMHG | HEART RATE: 68 BPM | OXYGEN SATURATION: 96 % | RESPIRATION RATE: 18 BRPM | TEMPERATURE: 97.9 F | BODY MASS INDEX: 34.32 KG/M2

## 2021-10-06 DIAGNOSIS — S46.819A STRAIN OF TRAPEZIUS MUSCLE, UNSPECIFIED LATERALITY, INITIAL ENCOUNTER: ICD-10-CM

## 2021-10-06 DIAGNOSIS — S70.02XA CONTUSION OF LEFT HIP, INITIAL ENCOUNTER: Primary | ICD-10-CM

## 2021-10-06 PROCEDURE — 3017F COLORECTAL CA SCREEN DOC REV: CPT | Performed by: INTERNAL MEDICINE

## 2021-10-06 PROCEDURE — G8754 DIAS BP LESS 90: HCPCS | Performed by: INTERNAL MEDICINE

## 2021-10-06 PROCEDURE — G8427 DOCREV CUR MEDS BY ELIG CLIN: HCPCS | Performed by: INTERNAL MEDICINE

## 2021-10-06 PROCEDURE — G8753 SYS BP > OR = 140: HCPCS | Performed by: INTERNAL MEDICINE

## 2021-10-06 PROCEDURE — G8417 CALC BMI ABV UP PARAM F/U: HCPCS | Performed by: INTERNAL MEDICINE

## 2021-10-06 PROCEDURE — G8536 NO DOC ELDER MAL SCRN: HCPCS | Performed by: INTERNAL MEDICINE

## 2021-10-06 PROCEDURE — 1101F PT FALLS ASSESS-DOCD LE1/YR: CPT | Performed by: INTERNAL MEDICINE

## 2021-10-06 PROCEDURE — G8510 SCR DEP NEG, NO PLAN REQD: HCPCS | Performed by: INTERNAL MEDICINE

## 2021-10-06 PROCEDURE — 99213 OFFICE O/P EST LOW 20 MIN: CPT | Performed by: INTERNAL MEDICINE

## 2021-10-06 RX ORDER — CYCLOBENZAPRINE HCL 5 MG
1 TABLET ORAL
COMMUNITY
Start: 2021-10-04
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
## 2021-11-03 ENCOUNTER — OFFICE VISIT (OUTPATIENT)
Dept: INTERNAL MEDICINE CLINIC | Age: 71
End: 2021-11-03
Payer: MEDICARE

## 2021-11-03 VITALS
HEIGHT: 70 IN | SYSTOLIC BLOOD PRESSURE: 152 MMHG | TEMPERATURE: 97.7 F | WEIGHT: 241.2 LBS | OXYGEN SATURATION: 97 % | HEART RATE: 69 BPM | RESPIRATION RATE: 18 BRPM | DIASTOLIC BLOOD PRESSURE: 84 MMHG | BODY MASS INDEX: 34.53 KG/M2

## 2021-11-03 DIAGNOSIS — I10 ESSENTIAL HYPERTENSION: ICD-10-CM

## 2021-11-03 DIAGNOSIS — E11.3393 MODERATE NONPROLIFERATIVE DIABETIC RETINOPATHY OF BOTH EYES ASSOCIATED WITH TYPE 2 DIABETES MELLITUS, MACULAR EDEMA PRESENCE UNSPECIFIED (HCC): ICD-10-CM

## 2021-11-03 DIAGNOSIS — R10.31 INGUINAL PAIN, RIGHT: Primary | ICD-10-CM

## 2021-11-03 PROCEDURE — 3017F COLORECTAL CA SCREEN DOC REV: CPT | Performed by: INTERNAL MEDICINE

## 2021-11-03 PROCEDURE — 3051F HG A1C>EQUAL 7.0%<8.0%: CPT | Performed by: INTERNAL MEDICINE

## 2021-11-03 PROCEDURE — G8753 SYS BP > OR = 140: HCPCS | Performed by: INTERNAL MEDICINE

## 2021-11-03 PROCEDURE — G8510 SCR DEP NEG, NO PLAN REQD: HCPCS | Performed by: INTERNAL MEDICINE

## 2021-11-03 PROCEDURE — 99213 OFFICE O/P EST LOW 20 MIN: CPT | Performed by: INTERNAL MEDICINE

## 2021-11-03 PROCEDURE — G8536 NO DOC ELDER MAL SCRN: HCPCS | Performed by: INTERNAL MEDICINE

## 2021-11-03 PROCEDURE — G8754 DIAS BP LESS 90: HCPCS | Performed by: INTERNAL MEDICINE

## 2021-11-03 PROCEDURE — G8427 DOCREV CUR MEDS BY ELIG CLIN: HCPCS | Performed by: INTERNAL MEDICINE

## 2021-11-03 PROCEDURE — 1101F PT FALLS ASSESS-DOCD LE1/YR: CPT | Performed by: INTERNAL MEDICINE

## 2021-11-03 PROCEDURE — 2022F DILAT RTA XM EVC RTNOPTHY: CPT | Performed by: INTERNAL MEDICINE

## 2021-11-03 PROCEDURE — G8417 CALC BMI ABV UP PARAM F/U: HCPCS | Performed by: INTERNAL MEDICINE

## 2021-11-03 NOTE — PROGRESS NOTES
Chief Complaint   Patient presents with    Groin Pain     \"dull pain, uncomfortable feeling\" x 4 days         1. Have you been to the ER, urgent care clinic since your last visit? Hospitalized since your last visit? No    2. Have you seen or consulted any other health care providers outside of the [address] [place] [address] since your last visit? Include any pap smears or colon screening.  No

## 2021-11-07 NOTE — PROGRESS NOTES
Chief Complaint   Patient presents with    Groin Pain     \"dull pain, uncomfortable feeling\" x 4 days   . SUBECTIVE:    Lennox Georgia is a 80 y.o. male comes in complaining of pain in his right inguinal area. He feels that it may involve the right testicle also. It has been present now for the last several days. There has been no antecedent history of trauma. He does have a penile implant. He has a past history of primary hypertension, dyslipidemia and diabetes mellitus all of which have been quite stable. Current Outpatient Medications   Medication Sig Dispense Refill    cyclobenzaprine (FLEXERIL) 5 mg tablet Take 1 Tablet by mouth nightly.  lisinopriL (PRINIVIL, ZESTRIL) 20 mg tablet Take 1 tablet by mouth once daily 90 Tablet 3    levothyroxine (SYNTHROID) 100 mcg tablet Take 1 Tablet by mouth Daily (before breakfast). 90 Tablet 3    glucose blood VI test strips (FreeStyle Lite Strips) strip USE 1 STRIP TO CHECK GLUCOSE THREE TIMES DAILY 100 Strip 11    insulin NPH/insulin regular (HumuLIN 70/30 U-100 Insulin) 100 unit/mL (70-30) injection 36 units before breakfast and dinner 3 Vial 11    metFORMIN ER (GLUCOPHAGE XR) 500 mg tablet Take 1 Tablet by mouth two (2) times daily (with meals). 180 Tablet 3    brimonidine (ALPHAGAN) 0.2 % ophthalmic solution INSTILL 1 DROP INTO RIGHT EYE TWICE DAILY      latanoprost (XALATAN) 0.005 % ophthalmic solution INSTILL 1 DROP INTO RIGHT EYE ONCE DAILY AT BEDTIME      lancets (FreeStyle Lancets) 28 gauge misc USE 1  TO CHECK GLUCOSE THREE TIMES DAILY 100 Lancet 11    predniSONE (DELTASONE) 5 mg tablet Take 1 tablet by mouth once daily 30 Tab 11    insulin syringe-needle U-100 1/2 mL 31 gauge x 15/64\" syrg INJECT INSULIN TWICE DAILY 100 Pen Needle 11    atorvastatin (LIPITOR) 40 mg tablet Take 1 Tab by mouth daily. 90 Tab 3    Blood-Glucose Meter (FREESTYLE LITE METER) monitoring kit Use to test blood sugar three times a day.  Dx.e11.9 1 Kit 0    blood-glucose meter (FREESTYLE LITE METER) by Does Not Apply route.  blood sugar diagnostic (FREESTYLE LITE STRIPS) by In Vitro route.  co-enzyme Q-10 (COQ-10) 100 mg capsule Take 100 mg by mouth daily.  aspirin (AGATHA CHEWABLE ASPIRIN) 81 mg chewable tablet Take 81 mg by mouth daily.  multivit,tx with iron,minerals (COMPLETE MULTIVITAMIN PO) Take 1 Tab by mouth daily. Past Medical History:   Diagnosis Date    Diabetes ([facility] [address]     Hypertension      Past Surgical History:   Procedure Laterality Date    HX COLONOSCOPY  2015    Joe----unremarkable    HX HEENT      laser for retinopathy    HX ORTHOPAEDIC      achilles tendon surgery    HX REFRACTIVE SURGERY      CO ABDOMEN SURGERY PROC UNLISTED      hemmoroidectomy     No Known Allergies    REVIEW OF SYSTEMS:  Review of Systems - Negative except   ENT ROS: negative for - headaches, hearing change, nasal congestion, oral lesions, tinnitus, visual changes or   Respiratory ROS: no cough, shortness of breath, or wheezing  Cardiovascular ROS: no chest pain or dyspnea on exertion  Gastrointestinal ROS: no abdominal pain, change in bowel habits, or black or blood  Genito-Urinary ROS: no dysuria, trouble voiding, or hematuria  Musculoskeletal ROS: negative  Neurological ROS: no TIA or stroke symptoms      Social History     Socioeconomic History    Marital status: UNKNOWN    Number of children: 4   Occupational History    Occupation: retired [employer] Occupation: retired [employer] after 5 years   Tobacco Use    Smoking status: Former Smoker    Smokeless tobacco: Never Used   Vaping Use    Vaping Use: Never used   Substance and Sexual Activity    Alcohol use:  Yes     Alcohol/week: 1.0 standard drink     Types: 1 Glasses of wine per week    Drug use: No    Sexual activity: Yes   r  Family History   Problem Relation Age of Onset    Heart Disease Mother        OBJECTIVE:  Visit Vitals  BP (!) 152/84   Pulse 69   Temp 97.7 °F (36.5 °C) (Oral)   Resp 18   Ht 5' 10\" (1.778 m)   Wt 241 lb 3.2 oz (109.4 kg)   SpO2 97%   BMI 34.61 kg/m²     ENT: perrla,  eom intact  NECK: supple. Thyroid normal, no JVD  CHEST: clear to ascultation and percussion   HEART: regular rate and rhythm  ABD: soft, bowel sounds active,   EXTREMITIES: no edema, pulse 1+  INTEGUMENT: clear  Genitalia: No obvious right testicular pathology, no inguinal hernia, penile implant      ASSESSMENT:  1. Inguinal pain, right    2. Essential hypertension    3. Moderate nonproliferative diabetic retinopathy of both eyes associated with type 2 diabetes mellitus, macular edema presence unspecified ([address]        PLAN:  1. The patient will be referred to an urologist because I do not find any obvious etiology today. 2. Blood pressure is excellent, no adjustments are made. 3. His diabetes has indeed been doing reasonably well based on his last hemoglobin A1c. .  Orders Placed This Encounter    REFERRAL TO UROLOGY       Follow-up and Dispositions    · Return keep old apt.            Lesly Gao MD

## 2021-12-22 RX ORDER — SYRINGE AND NEEDLE,INSULIN,1ML 31GX15/64"
SYRINGE, EMPTY DISPOSABLE MISCELLANEOUS
Qty: 100 PEN NEEDLE | Refills: 11 | Status: SHIPPED | OUTPATIENT
Start: 2021-12-22 | End: 2022-08-31 | Stop reason: SDUPTHER

## 2021-12-27 DIAGNOSIS — M19.90 INFLAMMATORY OSTEOARTHRITIS: ICD-10-CM

## 2021-12-27 RX ORDER — PREDNISONE 5 MG/1
5 TABLET ORAL DAILY
Qty: 30 TABLET | Refills: 11 | Status: SHIPPED | OUTPATIENT
Start: 2021-12-27 | End: 2022-01-06 | Stop reason: SDUPTHER

## 2021-12-27 RX ORDER — LEVOTHYROXINE SODIUM 100 UG/1
100 TABLET ORAL
Qty: 90 TABLET | Refills: 3 | Status: SHIPPED | OUTPATIENT
Start: 2021-12-27 | End: 2022-02-01 | Stop reason: SDUPTHER

## 2022-01-06 DIAGNOSIS — M19.90 INFLAMMATORY OSTEOARTHRITIS: ICD-10-CM

## 2022-01-06 RX ORDER — LISINOPRIL 20 MG/1
20 TABLET ORAL DAILY
Qty: 90 TABLET | Refills: 3 | Status: SHIPPED | OUTPATIENT
Start: 2022-01-06 | End: 2022-04-01 | Stop reason: SDUPTHER

## 2022-01-06 RX ORDER — PREDNISONE 5 MG/1
5 TABLET ORAL DAILY
Qty: 90 TABLET | Refills: 3 | Status: SHIPPED | OUTPATIENT
Start: 2022-01-06 | End: 2022-02-01 | Stop reason: SDUPTHER

## 2022-01-10 RX ORDER — LANCETS 28 GAUGE
EACH MISCELLANEOUS
Qty: 100 LANCET | Refills: 11 | Status: SHIPPED | OUTPATIENT
Start: 2022-01-10

## 2022-02-01 DIAGNOSIS — M19.90 INFLAMMATORY OSTEOARTHRITIS: ICD-10-CM

## 2022-02-02 RX ORDER — LEVOTHYROXINE SODIUM 100 UG/1
100 TABLET ORAL
Qty: 90 TABLET | Refills: 3 | Status: SHIPPED | OUTPATIENT
Start: 2022-02-02 | End: 2022-05-16 | Stop reason: SDUPTHER

## 2022-02-02 RX ORDER — ISOPROPYL ALCOHOL 70 ML/100ML
SWAB TOPICAL
Qty: 300 PAD | Refills: 3 | Status: SHIPPED | OUTPATIENT
Start: 2022-02-02

## 2022-02-02 RX ORDER — PREDNISONE 5 MG/1
5 TABLET ORAL DAILY
Qty: 90 TABLET | Refills: 3 | Status: SHIPPED | OUTPATIENT
Start: 2022-02-02 | End: 2022-04-01 | Stop reason: SDUPTHER

## 2022-02-04 RX ORDER — LANCETS 33 GAUGE
EACH MISCELLANEOUS
Qty: 300 LANCET | Refills: 3 | Status: SHIPPED | OUTPATIENT
Start: 2022-02-04 | End: 2022-08-31 | Stop reason: SDUPTHER

## 2022-02-04 RX ORDER — BLOOD-GLUCOSE METER
EACH MISCELLANEOUS
Qty: 1 EACH | Refills: 3 | Status: SHIPPED | OUTPATIENT
Start: 2022-02-04

## 2022-02-04 RX ORDER — CALCIUM CITRATE/VITAMIN D3 200MG-6.25
TABLET ORAL
Qty: 300 STRIP | Refills: 3 | Status: SHIPPED | OUTPATIENT
Start: 2022-02-04 | End: 2022-08-31 | Stop reason: SDUPTHER

## 2022-02-04 RX ORDER — BLOOD-GLUCOSE METER
EACH MISCELLANEOUS
Qty: 1 EACH | Refills: 0 | Status: SHIPPED | OUTPATIENT
Start: 2022-02-04 | End: 2022-08-31 | Stop reason: SDUPTHER

## 2022-02-08 DIAGNOSIS — E78.5 DYSLIPIDEMIA: ICD-10-CM

## 2022-02-08 RX ORDER — ATORVASTATIN CALCIUM 40 MG/1
40 TABLET, FILM COATED ORAL DAILY
Qty: 90 TABLET | Refills: 3 | Status: SHIPPED | OUTPATIENT
Start: 2022-02-08 | End: 2022-05-12 | Stop reason: SDUPTHER

## 2022-02-24 RX ORDER — METFORMIN HYDROCHLORIDE 500 MG/1
500 TABLET, EXTENDED RELEASE ORAL 2 TIMES DAILY WITH MEALS
Qty: 180 TABLET | Refills: 3 | Status: SHIPPED | OUTPATIENT
Start: 2022-02-24 | End: 2022-06-01 | Stop reason: SDUPTHER

## 2022-03-18 PROBLEM — I87.2 VENOUS INSUFFICIENCY: Status: ACTIVE | Noted: 2020-08-20

## 2022-03-19 PROBLEM — E78.5 DYSLIPIDEMIA: Status: ACTIVE | Noted: 2018-08-20

## 2022-03-19 PROBLEM — E66.9 OBESITY (BMI 30.0-34.9): Status: ACTIVE | Noted: 2020-12-27

## 2022-03-19 PROBLEM — E03.9 ACQUIRED HYPOTHYROIDISM: Status: ACTIVE | Noted: 2018-08-20

## 2022-03-19 PROBLEM — E66.811 OBESITY (BMI 30.0-34.9): Status: ACTIVE | Noted: 2020-12-27

## 2022-03-19 PROBLEM — N40.0 BENIGN PROSTATIC HYPERPLASIA WITHOUT LOWER URINARY TRACT SYMPTOMS: Status: ACTIVE | Noted: 2018-08-20

## 2022-03-19 PROBLEM — M19.90 INFLAMMATORY OSTEOARTHRITIS: Status: ACTIVE | Noted: 2019-03-12

## 2022-03-19 PROBLEM — E66.811 CLASS 1 OBESITY DUE TO EXCESS CALORIES WITH SERIOUS COMORBIDITY AND BODY MASS INDEX (BMI) OF 30.0 TO 30.9 IN ADULT: Status: ACTIVE | Noted: 2018-10-28

## 2022-03-19 PROBLEM — E11.21 TYPE 2 DIABETES WITH NEPHROPATHY (HCC): Status: ACTIVE | Noted: 2020-05-29

## 2022-03-19 PROBLEM — E11.3393 CONTROLLED TYPE 2 DIABETES MELLITUS WITH BOTH EYES AFFECTED BY MODERATE NONPROLIFERATIVE RETINOPATHY WITHOUT MACULAR EDEMA, WITH LONG-TERM CURRENT USE OF INSULIN (HCC): Status: ACTIVE | Noted: 2018-08-20

## 2022-03-19 PROBLEM — R45.4 DIFFICULTY CONTROLLING ANGER: Status: ACTIVE | Noted: 2020-05-29

## 2022-03-19 PROBLEM — R09.89 BRUIT OF RIGHT CAROTID ARTERY: Status: ACTIVE | Noted: 2018-12-11

## 2022-03-19 PROBLEM — E11.3393 MODERATE NONPROLIFERATIVE DIABETIC RETINOPATHY OF BOTH EYES ASSOCIATED WITH TYPE 2 DIABETES MELLITUS (HCC): Status: ACTIVE | Noted: 2019-03-12

## 2022-03-19 PROBLEM — Z79.4 CONTROLLED TYPE 2 DIABETES MELLITUS WITH BOTH EYES AFFECTED BY MODERATE NONPROLIFERATIVE RETINOPATHY WITHOUT MACULAR EDEMA, WITH LONG-TERM CURRENT USE OF INSULIN (HCC): Status: ACTIVE | Noted: 2018-08-20

## 2022-03-19 PROBLEM — E66.09 CLASS 1 OBESITY DUE TO EXCESS CALORIES WITH SERIOUS COMORBIDITY AND BODY MASS INDEX (BMI) OF 30.0 TO 30.9 IN ADULT: Status: ACTIVE | Noted: 2018-10-28

## 2022-03-20 PROBLEM — I10 ESSENTIAL HYPERTENSION: Status: ACTIVE | Noted: 2018-08-20

## 2022-04-01 DIAGNOSIS — M19.90 INFLAMMATORY OSTEOARTHRITIS: ICD-10-CM

## 2022-04-02 RX ORDER — LISINOPRIL 20 MG/1
20 TABLET ORAL DAILY
Qty: 90 TABLET | Refills: 3 | Status: SHIPPED | OUTPATIENT
Start: 2022-04-02 | End: 2022-04-07 | Stop reason: SDUPTHER

## 2022-04-02 RX ORDER — PREDNISONE 5 MG/1
5 TABLET ORAL DAILY
Qty: 90 TABLET | Refills: 3 | Status: SHIPPED | OUTPATIENT
Start: 2022-04-02

## 2022-04-07 RX ORDER — LISINOPRIL 20 MG/1
20 TABLET ORAL DAILY
Qty: 90 TABLET | Refills: 3 | Status: SHIPPED | OUTPATIENT
Start: 2022-04-07

## 2022-04-23 RX ORDER — INSULIN HUMAN 100 [IU]/ML
36 INJECTION, SUSPENSION SUBCUTANEOUS
Qty: 8 ADJUSTABLE DOSE PRE-FILLED PEN SYRINGE | Refills: 11 | Status: SHIPPED | OUTPATIENT
Start: 2022-04-23

## 2022-05-12 DIAGNOSIS — E78.5 DYSLIPIDEMIA: ICD-10-CM

## 2022-05-12 RX ORDER — ATORVASTATIN CALCIUM 40 MG/1
40 TABLET, FILM COATED ORAL DAILY
Qty: 90 TABLET | Refills: 3 | Status: SHIPPED | OUTPATIENT
Start: 2022-05-12

## 2022-05-16 RX ORDER — LEVOTHYROXINE SODIUM 100 UG/1
100 TABLET ORAL
Qty: 90 TABLET | Refills: 3 | Status: SHIPPED | OUTPATIENT
Start: 2022-05-16

## 2022-05-25 ENCOUNTER — OFFICE VISIT (OUTPATIENT)
Dept: INTERNAL MEDICINE CLINIC | Age: 72
End: 2022-05-25
Payer: MEDICARE

## 2022-05-25 VITALS
OXYGEN SATURATION: 98 % | BODY MASS INDEX: 35.18 KG/M2 | RESPIRATION RATE: 20 BRPM | TEMPERATURE: 97.4 F | DIASTOLIC BLOOD PRESSURE: 60 MMHG | HEART RATE: 71 BPM | SYSTOLIC BLOOD PRESSURE: 128 MMHG | HEIGHT: 70 IN | WEIGHT: 245.7 LBS

## 2022-05-25 DIAGNOSIS — E11.3393 CONTROLLED TYPE 2 DIABETES MELLITUS WITH BOTH EYES AFFECTED BY MODERATE NONPROLIFERATIVE RETINOPATHY WITHOUT MACULAR EDEMA, WITH LONG-TERM CURRENT USE OF INSULIN (HCC): Primary | ICD-10-CM

## 2022-05-25 DIAGNOSIS — Z13.31 SCREENING FOR DEPRESSION: ICD-10-CM

## 2022-05-25 DIAGNOSIS — E78.5 DYSLIPIDEMIA: ICD-10-CM

## 2022-05-25 DIAGNOSIS — E66.09 CLASS 1 OBESITY DUE TO EXCESS CALORIES WITH SERIOUS COMORBIDITY AND BODY MASS INDEX (BMI) OF 30.0 TO 30.9 IN ADULT: ICD-10-CM

## 2022-05-25 DIAGNOSIS — I10 ESSENTIAL HYPERTENSION: ICD-10-CM

## 2022-05-25 DIAGNOSIS — Z79.4 CONTROLLED TYPE 2 DIABETES MELLITUS WITH BOTH EYES AFFECTED BY MODERATE NONPROLIFERATIVE RETINOPATHY WITHOUT MACULAR EDEMA, WITH LONG-TERM CURRENT USE OF INSULIN (HCC): Primary | ICD-10-CM

## 2022-05-25 DIAGNOSIS — Z00.00 WELLNESS EXAMINATION: ICD-10-CM

## 2022-05-25 LAB
CREAT UR-MCNC: 158 MG/DL
EST. AVERAGE GLUCOSE BLD GHB EST-MCNC: 166 MG/DL
HBA1C MFR BLD: 7.4 % (ref 4–5.6)
MICROALBUMIN UR-MCNC: 0.64 MG/DL
MICROALBUMIN/CREAT UR-RTO: 4 MG/G (ref 0–30)

## 2022-05-25 PROCEDURE — G8432 DEP SCR NOT DOC, RNG: HCPCS | Performed by: INTERNAL MEDICINE

## 2022-05-25 PROCEDURE — G8417 CALC BMI ABV UP PARAM F/U: HCPCS | Performed by: INTERNAL MEDICINE

## 2022-05-25 PROCEDURE — 3017F COLORECTAL CA SCREEN DOC REV: CPT | Performed by: INTERNAL MEDICINE

## 2022-05-25 PROCEDURE — G8427 DOCREV CUR MEDS BY ELIG CLIN: HCPCS | Performed by: INTERNAL MEDICINE

## 2022-05-25 PROCEDURE — 3051F HG A1C>EQUAL 7.0%<8.0%: CPT | Performed by: INTERNAL MEDICINE

## 2022-05-25 PROCEDURE — G8754 DIAS BP LESS 90: HCPCS | Performed by: INTERNAL MEDICINE

## 2022-05-25 PROCEDURE — 99214 OFFICE O/P EST MOD 30 MIN: CPT | Performed by: INTERNAL MEDICINE

## 2022-05-25 PROCEDURE — G0439 PPPS, SUBSEQ VISIT: HCPCS | Performed by: INTERNAL MEDICINE

## 2022-05-25 PROCEDURE — 1101F PT FALLS ASSESS-DOCD LE1/YR: CPT | Performed by: INTERNAL MEDICINE

## 2022-05-25 PROCEDURE — 2022F DILAT RTA XM EVC RTNOPTHY: CPT | Performed by: INTERNAL MEDICINE

## 2022-05-25 PROCEDURE — 1123F ACP DISCUSS/DSCN MKR DOCD: CPT | Performed by: INTERNAL MEDICINE

## 2022-05-25 PROCEDURE — G8536 NO DOC ELDER MAL SCRN: HCPCS | Performed by: INTERNAL MEDICINE

## 2022-05-25 PROCEDURE — G8752 SYS BP LESS 140: HCPCS | Performed by: INTERNAL MEDICINE

## 2022-05-25 RX ORDER — INSULIN HUMAN 100 [IU]/ML
INJECTION, SUSPENSION SUBCUTANEOUS
COMMUNITY
Start: 2022-05-17

## 2022-05-25 RX ORDER — METFORMIN HYDROCHLORIDE 1000 MG/1
1000 TABLET ORAL
COMMUNITY
End: 2022-08-31 | Stop reason: SDUPTHER

## 2022-05-25 RX ORDER — LISINOPRIL 20 MG/1
20 TABLET ORAL
COMMUNITY
End: 2022-08-31 | Stop reason: SDUPTHER

## 2022-05-25 RX ORDER — LEVOTHYROXINE SODIUM 100 UG/1
100 TABLET ORAL
COMMUNITY
End: 2022-08-31 | Stop reason: SDUPTHER

## 2022-05-25 NOTE — PROGRESS NOTES
[facility]  [place] [address]  [address] [place] [address]  Phone:  [phone]  Fax: [phone]       Chief Complaint   Patient presents with    Hypertension    Diabetes   . SUBJECTIVE:    Xiomy Hinojosa is a 71 y.o. male comes in for return visit after a long absence. He states he is taking all of his medications as prescribed. Blood sugars have generally been less than 150 for the most part. He has not had any interventional hypoglycemia. He has had no meaningful weight loss. He is not that active physically. He has a past history of primary hypertension and dyslipidemia. I reminded him the importance of following up with his ophthalmologist.           Current Outpatient Medications   Medication Sig Dispense Refill    levothyroxine (SYNTHROID) 100 mcg tablet Take 1 Tablet by mouth Daily (before breakfast). 90 Tablet 3    atorvastatin (LIPITOR) 40 mg tablet Take 1 Tablet by mouth daily. 90 Tablet 3    insulin nph-regular human rec (HumuLIN 70/30 U-100 KwikPen) 100 unit/mL (70-30) inpn 36 Units by SubCUTAneous route Before breakfast and dinner. 8 Adjustable Dose Pre-filled Pen Syringe 11    lisinopriL (PRINIVIL, ZESTRIL) 20 mg tablet Take 1 Tablet by mouth daily. 90 Tablet 3    Insulin Needles, Disposable, 31 gauge x 5/16\" ndle Use to inject insulin twice a day. Dx.e11.9 100 Each 11    predniSONE (DELTASONE) 5 mg tablet Take 1 Tablet by mouth daily. 90 Tablet 3    metFORMIN ER (GLUCOPHAGE XR) 500 mg tablet Take 1 Tablet by mouth two (2) times daily (with meals).  180 Tablet 3    Blood-Glucose Meter (True Metrix Air Glucose Meter) misc Use to test blood sugar three times a day 1 Each 0    glucose blood VI test strips (True Metrix Glucose Test Strip) strip Use to test blood sugar three times a day 300 Strip 3    lancets (TRUEplus Lancets) 33 gauge misc Use to test blood sugar three times a day 300 Lancet 3    Blood Glucose Control, Low (True Metrix Level 1) soln Use to calibrate glucometer weekly 1 Each 3    alcohol swabs (BD Single Use Swabs Regular) padm Use to test blood sugar three times. 300 Pad 3    lancets (FreeStyle Lancets) 28 gauge misc USE 1  TO CHECK GLUCOSE THREE TIMES DAILY 100 Lancet 11    insulin syringe-needle U-100 1/2 mL 31 gauge x 15/64\" syrg USE 1 SYRINGE TWICE DAILY 50 Pen Needle 11    insulin syringe-needle U-100 1/2 mL 31 gauge x 15/64\" syrg INJECT INSULIN TWICE DAILY. dx.e11.9 100 Pen Needle 11    cyclobenzaprine (FLEXERIL) 5 mg tablet Take 1 Tablet by mouth nightly.  glucose blood VI test strips (FreeStyle Lite Strips) strip USE 1 STRIP TO CHECK GLUCOSE THREE TIMES DAILY 100 Strip 11    brimonidine (ALPHAGAN) 0.2 % ophthalmic solution INSTILL 1 DROP INTO RIGHT EYE TWICE DAILY      latanoprost (XALATAN) 0.005 % ophthalmic solution INSTILL 1 DROP INTO RIGHT EYE ONCE DAILY AT BEDTIME      Blood-Glucose Meter (FREESTYLE LITE METER) monitoring kit Use to test blood sugar three times a day. Dx.e11.9 1 Kit 0    blood-glucose meter (FREESTYLE LITE METER) by Does Not Apply route.  blood sugar diagnostic (FREESTYLE LITE STRIPS) by In Vitro route.  co-enzyme Q-10 (COQ-10) 100 mg capsule Take 100 mg by mouth daily.  aspirin (AGATHA CHEWABLE ASPIRIN) 81 mg chewable tablet Take 81 mg by mouth daily.  multivit,tx with iron,minerals (COMPLETE MULTIVITAMIN PO) Take 1 Tab by mouth daily.  HumuLIN 70/30 U-100 Insulin 100 unit/mL (70-30) injection INJECT 36 UNITS BEFORE BREAKFAST AND DINNER      levothyroxine (SYNTHROID) 100 mcg tablet 100 mcg.  lisinopriL (PRINIVIL, ZESTRIL) 20 mg tablet 20 mg.      metFORMIN (GLUCOPHAGE) 1,000 mg tablet 1,000 mg.        Past Medical History:   Diagnosis Date    Diabetes ([address]     Hypertension      Past Surgical History:   Procedure Laterality Date    HX COLONOSCOPY  2015    Joe----unremarkable    HX HEENT      laser for retinopathy    HX ORTHOPAEDIC      achilles tendon surgery    HX REFRACTIVE SURGERY      HX UROLOGICAL      penile implant    IN ABDOMEN SURGERY PROC UNLISTED      hemmoroidectomy     No Known Allergies      REVIEW OF SYSTEMS:  General: negative for - chills or fever  ENT: negative for - headaches, nasal congestion or tinnitus  Respiratory: negative for - cough, hemoptysis, shortness of breath or wheezing  Cardiovascular : negative for - chest pain, edema, palpitations or shortness of breath  Gastrointestinal: negative for - abdominal pain, blood in stools, heartburn or nausea/vomiting  Genito-Urinary: no dysuria, trouble voiding, or hematuria  Musculoskeletal: negative for - gait disturbance, joint pain, joint stiffness or joint swelling  Neurological: no TIA or stroke symptoms  Hematologic: no bruises, no bleeding, no swollen glands  Integument: no lumps, mole changes, nail changes or rash  Endocrine: no malaise/lethargy or unexpected weight changes      Social History     Socioeconomic History    Marital status: UNKNOWN    Number of children: 4   Occupational History    Occupation: [employer] fro [employer] Occupation: retired [employer] after 5 years   Tobacco Use    Smoking status: Former Smoker    Smokeless tobacco: Never Used   Vaping Use    Vaping Use: Never used   Substance and Sexual Activity    Alcohol use: Yes     Alcohol/week: 1.0 standard drink     Types: 1 Glasses of wine per week    Drug use: No    Sexual activity: Yes     Family History   Problem Relation Age of Onset    Heart Disease Mother        OBJECTIVE:    Visit Vitals  /60   Pulse 71   Temp 97.4 °F (36.3 °C) (Oral)   Resp 20   Ht 5' 10\" (1.778 m)   Wt 245 lb 11.2 oz (111.4 kg)   SpO2 98%   BMI 35.25 kg/m²     CONSTITUTIONAL: well , well nourished, appears age appropriate  EYES: perrla, eom intact  ENMT:moist mucous membranes, pharynx clear  NECK: supple.  Thyroid normal  RESPIRATORY: Chest: clear to ascultation and percussion   CARDIOVASCULAR: Heart: regular rate and rhythm  GASTROINTESTINAL: Abdomen: soft, bowel sounds active  HEMATOLOGIC: no pathological lymph nodes palpated  MUSCULOSKELETAL: Extremities: no edema, pulse 1+   INTEGUMENT: No unusual rashes or suspicious skin lesions noted. Nails appear normal.  NEUROLOGIC: non-focal exam   MENTAL STATUS: alert and oriented, appropriate affect      ASSESSMENT:  1. 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 Essential hypertension    3. Class 1 obesity due to excess calories with serious comorbidity and body mass index (BMI) of 30.0 to 30.9 in adult    4. Dyslipidemia    5. Screening for depression    6. Wellness examination        PLAN:  1. The patient's diabetes historically has been doing quite well. He is taking premixed insulin 37 units twice a day a.c. He has not had any interventional hypoglycemia. 2. Blood pressure is excellent, no adjustments are made. 3. I again encouraged weight reduction. This can be accomplished by eating meals, eliminating snacks, and avoiding the consumption of processed carbohydrates. 4. He has an increased cardiovascular risk and remains on his statin. Efficacy and compliance will be assessed. .  Orders Placed This Encounter    ANNUAL DEPRESSION SCREEN 8-15 MIN    HEMOGLOBIN A1C WITH EAG    MICROALBUMIN, UR, RAND W/ MICROALB/CREAT RATIO    APOLIPOPROTEIN B    HumuLIN 70/30 U-100 Insulin 100 unit/mL (70-30) injection    levothyroxine (SYNTHROID) 100 mcg tablet    lisinopriL (PRINIVIL, ZESTRIL) 20 mg tablet    metFORMIN (GLUCOPHAGE) 1,000 mg tablet         Follow-up and Dispositions    · Return in about 3 months (around 8/25/2022).            Charlie Corcoran MD  This is the Subsequent Medicare Annual Wellness Exam, performed 12 months or more after the Initial AWV or the last Subsequent AWV    I have reviewed the patient's medical history in detail and updated the computerized patient record. Assessment/Plan   Education and counseling provided:  Are appropriate based on today's review and evaluation    1. 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 Essential hypertension  3. Class 1 obesity due to excess calories with serious comorbidity and body mass index (BMI) of 30.0 to 30.9 in adult  4. Dyslipidemia  -     APOLIPOPROTEIN B; Future  5. Screening for depression  -     DEPRESSION SCREEN ANNUAL  6. Wellness examination       Depression Risk Factor Screening     3 most recent PHQ Screens 11/3/2021   Little interest or pleasure in doing things Not at all   Feeling down, depressed, irritable, or hopeless Not at all   Total Score PHQ 2 0       Alcohol & Drug Abuse Risk Screen    Do you average more than 1 drink per night or more than 7 drinks a week: No    In the past three months have you have had more than 4 drinks containing alcohol on one occasion: No          Functional Ability and Level of Safety    Hearing: Hearing is good. Activities of Daily Living: The home contains: no safety equipment. Patient does total self care      Ambulation: with no difficulty     Fall Risk:  Fall Risk Assessment, last 12 mths 11/3/2021   Able to walk? Yes   Fall in past 12 months? 0   Do you feel unsteady?  0   Are you worried about falling 0      Abuse Screen:  Patient is not abused       Cognitive Screening    Has your family/caregiver stated any concerns about your memory: no     Cognitive Screening: Not applicable    Health Maintenance Due     Health Maintenance Due   Topic Date Due    DTaP/Tdap/Td series (1 - Tdap) Never done    Shingrix Vaccine Age 50> (1 of 2) Never done    Colorectal Cancer Screening Combo  09/04/2019    Pneumococcal 65+ years (2 - PCV) 08/31/2020    Foot Exam Q1  12/16/2020    COVID-19 Vaccine (3 - Booster for Pfizer series) 08/29/2021       Patient Care Team   Patient Care Team:  Rebecca Thomas MD as PCP - General (Internal Medicine Physician)  Rebecca Thomas MD as PCP - [facility] Empaneled Provider    History     Patient Active Problem List   Diagnosis Code    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, with long-term current use of insulin ([address] V65.7422, Z79.4    Essential hypertension I10    Dyslipidemia E78.5    Benign prostatic hyperplasia without lower urinary tract symptoms N40.0    Acquired hypothyroidism E03.9    Class 1 obesity due to excess calories with serious comorbidity and body mass index (BMI) of 30.0 to 30.9 in adult E66.09, Z68.30    Bruit of right carotid artery R09.89    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 9) E66.9     Past Medical History:   Diagnosis Date    Diabetes ([address]     Hypertension       Past Surgical History:   Procedure Laterality Date    HX COLONOSCOPY  2015    Joe----unremarkable    HX HEENT      laser for retinopathy    HX ORTHOPAEDIC      achilles tendon surgery    HX REFRACTIVE SURGERY      HX UROLOGICAL      penile implant    MN ABDOMEN SURGERY PROC UNLISTED      hemmoroidectomy     Current Outpatient Medications   Medication Sig Dispense Refill    levothyroxine (SYNTHROID) 100 mcg tablet Take 1 Tablet by mouth Daily (before breakfast). 90 Tablet 3    atorvastatin (LIPITOR) 40 mg tablet Take 1 Tablet by mouth daily.  90 Tablet 3    insulin nph-regular human rec (HumuLIN 70/30 U-100 KwikPen) 100 unit/mL (70-30) inpn 36 Units by SubCUTAneous route Before breakfast and dinner. 8 Adjustable Dose Pre-filled Pen Syringe 11    lisinopriL (PRINIVIL, ZESTRIL) 20 mg tablet Take 1 Tablet by mouth daily. 90 Tablet 3    Insulin Needles, Disposable, 31 gauge x 5/16\" ndle Use to inject insulin twice a day. Dx.e11.9 100 Each 11    predniSONE (DELTASONE) 5 mg tablet Take 1 Tablet by mouth daily. 90 Tablet 3    metFORMIN ER (GLUCOPHAGE XR) 500 mg tablet Take 1 Tablet by mouth two (2) times daily (with meals). 180 Tablet 3    Blood-Glucose Meter (True Metrix Air Glucose Meter) misc Use to test blood sugar three times a day 1 Each 0    glucose blood VI test strips (True Metrix Glucose Test Strip) strip Use to test blood sugar three times a day 300 Strip 3    lancets (TRUEplus Lancets) 33 gauge misc Use to test blood sugar three times a day 300 Lancet 3    Blood Glucose Control, Low (True Metrix Level 1) soln Use to calibrate glucometer weekly 1 Each 3    alcohol swabs (BD Single Use Swabs Regular) padm Use to test blood sugar three times. 300 Pad 3    lancets (FreeStyle Lancets) 28 gauge misc USE 1  TO CHECK GLUCOSE THREE TIMES DAILY 100 Lancet 11    insulin syringe-needle U-100 1/2 mL 31 gauge x 15/64\" syrg USE 1 SYRINGE TWICE DAILY 50 Pen Needle 11    insulin syringe-needle U-100 1/2 mL 31 gauge x 15/64\" syrg INJECT INSULIN TWICE DAILY. dx.e11.9 100 Pen Needle 11    cyclobenzaprine (FLEXERIL) 5 mg tablet Take 1 Tablet by mouth nightly.  glucose blood VI test strips (FreeStyle Lite Strips) strip USE 1 STRIP TO CHECK GLUCOSE THREE TIMES DAILY 100 Strip 11    brimonidine (ALPHAGAN) 0.2 % ophthalmic solution INSTILL 1 DROP INTO RIGHT EYE TWICE DAILY      latanoprost (XALATAN) 0.005 % ophthalmic solution INSTILL 1 DROP INTO RIGHT EYE ONCE DAILY AT BEDTIME      Blood-Glucose Meter (FREESTYLE LITE METER) monitoring kit Use to test blood sugar three times a day.  Dx.e11.9 1 Kit 0    blood-glucose meter (FREESTYLE LITE METER) by Does Not Apply route.  blood sugar diagnostic (FREESTYLE LITE STRIPS) by In Vitro route.  co-enzyme Q-10 (COQ-10) 100 mg capsule Take 100 mg by mouth daily.  aspirin (AGATHA CHEWABLE ASPIRIN) 81 mg chewable tablet Take 81 mg by mouth daily.  multivit,tx with iron,minerals (COMPLETE MULTIVITAMIN PO) Take 1 Tab by mouth daily.  HumuLIN 70/30 U-100 Insulin 100 unit/mL (70-30) injection INJECT 36 UNITS BEFORE BREAKFAST AND DINNER      levothyroxine (SYNTHROID) 100 mcg tablet 100 mcg.  lisinopriL (PRINIVIL, ZESTRIL) 20 mg tablet 20 mg.      metFORMIN (GLUCOPHAGE) 1,000 mg tablet 1,000 mg. No Known Allergies    Family History   Problem Relation Age of Onset    Heart Disease Mother      Social History     Tobacco Use    Smoking status: Former Smoker    Smokeless tobacco: Never Used   Substance Use Topics    Alcohol use:  Yes     Alcohol/week: 1.0 standard drink     Types: 1 Glasses of wine per week         Gutierrez Gavin MD

## 2022-05-25 NOTE — PROGRESS NOTES
Rm    Chief Complaint   Patient presents with    Hypertension    Diabetes        Visit Vitals  BP (!) 159/67 (BP 1 Location: Left upper arm, BP Patient Position: Sitting, BP Cuff Size: Adult)   Pulse 71   Temp 97.4 °F (36.3 °C) (Oral)   Resp 20   Ht 5' 10\" (1.778 m)   Wt 245 lb 11.2 oz (111.4 kg)   SpO2 98%   BMI 35.25 kg/m²        1. Have you been to the ER, urgent care clinic since your last visit? Hospitalized since your last visit? No    2. Have you seen or consulted any other health care providers outside of the [address] [place] [address] since your last visit? Include any pap smears or colon screening. No     Health Maintenance Due   Topic Date Due    DTaP/Tdap/Td series (1 - Tdap) Never done    Shingrix Vaccine Age 50> (1 of 2) Never done    Colorectal Cancer Screening Combo  09/04/2019    Pneumococcal 65+ years (2 - PCV) 08/31/2020    Foot Exam Q1  12/16/2020    COVID-19 Vaccine (3 - Booster for Pfizer series) 08/29/2021        3 most recent PHQ Screens 11/3/2021   Little interest or pleasure in doing things Not at all   Feeling down, depressed, irritable, or hopeless Not at all   Total Score PHQ 2 0        Fall Risk Assessment, last 12 mths 11/3/2021   Able to walk? Yes   Fall in past 12 months? 0   Do you feel unsteady?  0   Are you worried about falling 0       Learning Assessment 8/20/2018   PRIMARY LEARNER Patient   HIGHEST LEVEL OF EDUCATION - PRIMARY LEARNER  GRADUATED HIGH SCHOOL OR GED   BARRIERS PRIMARY LEARNER NONE   CO-LEARNER CAREGIVER No   PRIMARY LANGUAGE ENGLISH   LEARNER PREFERENCE PRIMARY READING   ANSWERED BY patient   RELATIONSHIP SELF

## 2022-05-26 LAB — APO B SERPL-MCNC: 70 MG/DL

## 2022-05-26 NOTE — PATIENT INSTRUCTIONS
Medicare Wellness Visit, Male    The best way to live healthy is to have a lifestyle where you eat a well-balanced diet, exercise regularly, limit alcohol use, and quit all forms of tobacco/nicotine, if applicable. Regular preventive services are another way to keep healthy. Preventive services (vaccines, screening tests, monitoring & exams) can help personalize your care plan, which helps you manage your own care. Screening tests can find health problems at the earliest stages, when they are easiest to treat. [facility] follows the current, evidence-based guidelines published by the [facility] ([facility]) when recommending preventive services for our patients. Because we follow these guidelines, sometimes recommendations change over time as research supports it. (For example, a prostate screening blood test is no longer routinely recommended for men with no symptoms). Of course, you and your doctor may decide to screen more often for some diseases, based on your risk and co-morbidities (chronic disease you are already diagnosed with). Preventive services for you include:  - Medicare offers their members a free annual wellness visit, which is time for you and your primary care provider to discuss and plan for your preventive service needs. Take advantage of this benefit every year!  -All adults over age 72 should receive the recommended pneumonia vaccines. Current USPSTF guidelines recommend a series of two vaccines for the best pneumonia protection.   -All adults should have a flu vaccine yearly and tetanus vaccine every 10 years.  -All adults age 48 and older should receive the shingles vaccines (series of two vaccines).        -All adults age 38-68 who are overweight should have a diabetes screening test once every three years.   -Other screening tests & preventive services for persons with diabetes include: an eye exam to screen for diabetic retinopathy, a kidney function test, a foot exam, and stricter control over your cholesterol.   -Cardiovascular screening for adults with routine risk involves an electrocardiogram (ECG) at intervals determined by the provider.   -Colorectal cancer screening should be done for adults age 54-65 with no increased risk factors for colorectal cancer. There are a number of acceptable methods of screening for this type of cancer. Each test has its own benefits and drawbacks. Discuss with your provider what is most appropriate for you during your annual wellness visit. The different tests include: colonoscopy (considered the best screening method), a fecal occult blood test, a fecal DNA test, and sigmoidoscopy.  -All adults born between [facility] should be screened once for Hepatitis C.  -An Abdominal Aortic Aneurysm (AAA) Screening is recommended for men age 73-68 who has ever smoked in their lifetime.      Here is a list of your current Health Maintenance items (your personalized list of preventive services) with a due date:  Health Maintenance Due   Topic Date Due    DTaP/Tdap/Td  (1 - Tdap) Never done    Shingles Vaccine (1 of 2) Never done    Colorectal Screening  09/04/2019    Pneumococcal Vaccine (2 - PCV) 08/31/2020    Diabetic Foot Care  12/16/2020    COVID-19 Vaccine (3 - Booster for [place] series) 08/29/2021

## 2022-06-01 RX ORDER — METFORMIN HYDROCHLORIDE 500 MG/1
500 TABLET, EXTENDED RELEASE ORAL 2 TIMES DAILY WITH MEALS
Qty: 180 TABLET | Refills: 3 | Status: SHIPPED | OUTPATIENT
Start: 2022-06-01

## 2022-08-01 ENCOUNTER — NURSE TRIAGE (OUTPATIENT)
Dept: OTHER | Facility: CLINIC | Age: 72
End: 2022-08-01

## 2022-08-01 NOTE — TELEPHONE ENCOUNTER
Received call from [facility] at [facility] with Red Flag Complaint. Subjective: Caller states \"Groin pain\"     Current Symptoms: groin pain  Urine looks dark, possible blood;  Hurts to sit up;  Denies injury or open sores; Onset: 3 weeks ago; unchanged    Associated Symptoms: reduced activity    Pain Severity: 5/10; pinched nerve type pain; constant    Temperature: denies     What has been tried: Seen in ED; Has seen urologist;  Using heating pad    Recommended disposition: See in Office Today    Care advice provided, patient verbalizes understanding; denies any other questions or concerns; instructed to call back for any new or worsening symptoms. Patient/Caller agrees with recommended disposition; writer provided warm transfer to Vania Pearl at [facility] for appointment scheduling    Attention Provider: Thank you for allowing me to participate in the care of your patient. The patient was connected to triage in response to information provided to the ECC. Please do not respond through this encounter as the response is not directed to a shared pool.     Reason for Disposition   Patient wants to be seen    Protocols used: Scrotal Pain-ADULT-[place]

## 2022-08-31 ENCOUNTER — OFFICE VISIT (OUTPATIENT)
Dept: INTERNAL MEDICINE CLINIC | Age: 72
End: 2022-08-31
Payer: MEDICARE

## 2022-08-31 VITALS
BODY MASS INDEX: 31.98 KG/M2 | WEIGHT: 236.1 LBS | RESPIRATION RATE: 18 BRPM | OXYGEN SATURATION: 98 % | HEART RATE: 74 BPM | SYSTOLIC BLOOD PRESSURE: 170 MMHG | DIASTOLIC BLOOD PRESSURE: 69 MMHG | TEMPERATURE: 98.1 F | HEIGHT: 72 IN

## 2022-08-31 DIAGNOSIS — I10 ESSENTIAL HYPERTENSION: ICD-10-CM

## 2022-08-31 DIAGNOSIS — R09.89 BRUIT OF RIGHT CAROTID ARTERY: ICD-10-CM

## 2022-08-31 DIAGNOSIS — Z79.4 CONTROLLED TYPE 2 DIABETES MELLITUS WITH BOTH EYES AFFECTED BY MODERATE NONPROLIFERATIVE RETINOPATHY WITHOUT MACULAR EDEMA, WITH LONG-TERM CURRENT USE OF INSULIN (HCC): ICD-10-CM

## 2022-08-31 DIAGNOSIS — M19.90 INFLAMMATORY OSTEOARTHRITIS: ICD-10-CM

## 2022-08-31 DIAGNOSIS — E03.9 ACQUIRED HYPOTHYROIDISM: ICD-10-CM

## 2022-08-31 DIAGNOSIS — E78.5 DYSLIPIDEMIA: ICD-10-CM

## 2022-08-31 DIAGNOSIS — N40.0 BENIGN PROSTATIC HYPERPLASIA WITHOUT LOWER URINARY TRACT SYMPTOMS: ICD-10-CM

## 2022-08-31 DIAGNOSIS — I87.2 VENOUS INSUFFICIENCY: Primary | ICD-10-CM

## 2022-08-31 DIAGNOSIS — E11.3393 CONTROLLED TYPE 2 DIABETES MELLITUS WITH BOTH EYES AFFECTED BY MODERATE NONPROLIFERATIVE RETINOPATHY WITHOUT MACULAR EDEMA, WITH LONG-TERM CURRENT USE OF INSULIN (HCC): ICD-10-CM

## 2022-08-31 LAB
ALBUMIN SERPL-MCNC: 3.6 G/DL (ref 3.5–5)
ALBUMIN/GLOB SERPL: 1.3 {RATIO} (ref 1.1–2.2)
ALP SERPL-CCNC: 132 U/L (ref 45–117)
ALT SERPL-CCNC: 20 U/L (ref 12–78)
ANION GAP SERPL CALC-SCNC: 3 MMOL/L (ref 5–15)
AST SERPL-CCNC: 13 U/L (ref 15–37)
BILIRUB SERPL-MCNC: 0.5 MG/DL (ref 0.2–1)
BUN SERPL-MCNC: 15 MG/DL (ref 6–20)
BUN/CREAT SERPL: 13 (ref 12–20)
CALCIUM SERPL-MCNC: 9.1 MG/DL (ref 8.5–10.1)
CHLORIDE SERPL-SCNC: 111 MMOL/L (ref 97–108)
CO2 SERPL-SCNC: 31 MMOL/L (ref 21–32)
CREAT SERPL-MCNC: 1.14 MG/DL (ref 0.7–1.3)
GLOBULIN SER CALC-MCNC: 2.8 G/DL (ref 2–4)
GLUCOSE SERPL-MCNC: 38 MG/DL (ref 65–100)
POTASSIUM SERPL-SCNC: 3.3 MMOL/L (ref 3.5–5.1)
PROT SERPL-MCNC: 6.4 G/DL (ref 6.4–8.2)
SODIUM SERPL-SCNC: 145 MMOL/L (ref 136–145)

## 2022-08-31 PROCEDURE — 1101F PT FALLS ASSESS-DOCD LE1/YR: CPT | Performed by: INTERNAL MEDICINE

## 2022-08-31 PROCEDURE — G8427 DOCREV CUR MEDS BY ELIG CLIN: HCPCS | Performed by: INTERNAL MEDICINE

## 2022-08-31 PROCEDURE — 3017F COLORECTAL CA SCREEN DOC REV: CPT | Performed by: INTERNAL MEDICINE

## 2022-08-31 PROCEDURE — 2022F DILAT RTA XM EVC RTNOPTHY: CPT | Performed by: INTERNAL MEDICINE

## 2022-08-31 PROCEDURE — G8417 CALC BMI ABV UP PARAM F/U: HCPCS | Performed by: INTERNAL MEDICINE

## 2022-08-31 PROCEDURE — G8510 SCR DEP NEG, NO PLAN REQD: HCPCS | Performed by: INTERNAL MEDICINE

## 2022-08-31 PROCEDURE — 1123F ACP DISCUSS/DSCN MKR DOCD: CPT | Performed by: INTERNAL MEDICINE

## 2022-08-31 PROCEDURE — G8536 NO DOC ELDER MAL SCRN: HCPCS | Performed by: INTERNAL MEDICINE

## 2022-08-31 PROCEDURE — 3044F HG A1C LEVEL LT 7.0%: CPT | Performed by: INTERNAL MEDICINE

## 2022-08-31 PROCEDURE — G8753 SYS BP > OR = 140: HCPCS | Performed by: INTERNAL MEDICINE

## 2022-08-31 PROCEDURE — G8754 DIAS BP LESS 90: HCPCS | Performed by: INTERNAL MEDICINE

## 2022-08-31 PROCEDURE — 99214 OFFICE O/P EST MOD 30 MIN: CPT | Performed by: INTERNAL MEDICINE

## 2022-08-31 NOTE — PROGRESS NOTES
Stacy Hood is a 71 y.o. male  Chief Complaint   Patient presents with    Follow-up    Hypertension    Diabetes     Patient here for follow up diabetes and high blood pressure. 1. Have you been to the ER, urgent care clinic since your last visit? Hospitalized since your last visit? Yes When: June 2022 [facility] Reason for visit: right side pain. 2. Have you seen or consulted any other health care providers outside of the [address] [place] [address] since your last visit? Include any pap smears or colon screening.  No

## 2022-08-31 NOTE — PROGRESS NOTES
[address] [facility]  [place] [address]  [address] [facility] [address]  Phone:  [phone]  Fax: [phone]       Chief Complaint   Patient presents with    Follow-up    Hypertension    Diabetes     Patient here for follow up diabetes and high blood pressure. .      SUBJECTIVE:    Agustin Tello is a 71 y.o. male comes in for return visit stating that he has done generally well. He is concerned about swelling of his legs. It is asymmetrical and orthostatic in nature. He wonders the pain pills that he was taking was the potential culprit. He denies any suggestive symptoms of cardiac decompensation. He has been having pain in his hand thinking that this might well be related to rheumatoid arthritis. In reality I have told him before, this is osteoarthritis and I gave him prednisone 5 mg. It was not painful, but his functional ability is decreased somewhat. From a diabetic perspective, blood sugars have been \"reasonable\", but he only checks his sugars once a day, which is the morning. He therefore does not get an adequate reading of his overall diabetic control. He is currently taking his premixed insulin 36 units twice a day. He went to an urologist and nothing was found urologically. He has a past history of primary hypertension, dyslipidemia, as well as obesity. Current Outpatient Medications   Medication Sig Dispense Refill    metFORMIN ER (GLUCOPHAGE XR) 500 mg tablet Take 1 Tablet by mouth two (2) times daily (with meals). 180 Tablet 3    HumuLIN 70/30 U-100 Insulin 100 unit/mL (70-30) injection INJECT 36 UNITS BEFORE BREAKFAST AND DINNER      levothyroxine (SYNTHROID) 100 mcg tablet Take 1 Tablet by mouth Daily (before breakfast). 90 Tablet 3    atorvastatin (LIPITOR) 40 mg tablet Take 1 Tablet by mouth daily.  90 Tablet 3    insulin nph-regular human rec (HumuLIN 70/30 U-100 KwikPen) 100 unit/mL (70-30) inpn 36 Units by SubCUTAneous route Before breakfast and dinner. 8 Adjustable Dose Pre-filled Pen Syringe 11    lisinopriL (PRINIVIL, ZESTRIL) 20 mg tablet Take 1 Tablet by mouth daily. 90 Tablet 3    Insulin Needles, Disposable, 31 gauge x 5/16\" ndle Use to inject insulin twice a day. Dx.e11.9 100 Each 11    predniSONE (DELTASONE) 5 mg tablet Take 1 Tablet by mouth daily. 90 Tablet 3    Blood Glucose Control, Low (True Metrix Level 1) soln Use to calibrate glucometer weekly 1 Each 3    alcohol swabs (BD Single Use Swabs Regular) padm Use to test blood sugar three times. 300 Pad 3    lancets (FreeStyle Lancets) 28 gauge misc USE 1  TO CHECK GLUCOSE THREE TIMES DAILY 100 Lancet 11    insulin syringe-needle U-100 1/2 mL 31 gauge x 15/64\" syrg USE 1 SYRINGE TWICE DAILY 50 Pen Needle 11    cyclobenzaprine (FLEXERIL) 5 mg tablet Take 1 Tablet by mouth nightly. glucose blood VI test strips (FreeStyle Lite Strips) strip USE 1 STRIP TO CHECK GLUCOSE THREE TIMES DAILY 100 Strip 11    brimonidine (ALPHAGAN) 0.2 % ophthalmic solution INSTILL 1 DROP INTO RIGHT EYE TWICE DAILY      latanoprost (XALATAN) 0.005 % ophthalmic solution INSTILL 1 DROP INTO RIGHT EYE ONCE DAILY AT BEDTIME      blood-glucose meter (FREESTYLE LITE METER) by Does Not Apply route. blood sugar diagnostic (FREESTYLE LITE STRIPS) by In Vitro route. co-enzyme Q-10 (CO Q-10) 100 mg capsule Take 100 mg by mouth daily. aspirin 81 mg chewable tablet Take 81 mg by mouth daily. multivit,tx with iron,minerals (COMPLETE MULTIVITAMIN PO) Take 1 Tab by mouth daily.        Past Medical History:   Diagnosis Date    Diabetes ([address]     Hypertension      Past Surgical History:   Procedure Laterality Date    HX COLONOSCOPY  2015    Joe----unremarkable    HX HEENT      laser for retinopathy    HX ORTHOPAEDIC      achilles tendon surgery    HX REFRACTIVE SURGERY      HX UROLOGICAL      penile implant    NH ABDOMEN SURGERY PROC UNLISTED      hemmoroidectomy     No Known Allergies      REVIEW OF SYSTEMS:  General: negative for - chills or fever  ENT: negative for - headaches, nasal congestion or tinnitus  Respiratory: negative for - cough, hemoptysis, shortness of breath or wheezing  Cardiovascular : negative for - chest pain, edema, palpitations or shortness of breath  Gastrointestinal: negative for - abdominal pain, blood in stools, heartburn or nausea/vomiting  Genito-Urinary: no dysuria, trouble voiding, or hematuria  Musculoskeletal: negative for - gait disturbance, joint pain, joint stiffness or joint swelling  Neurological: no TIA or stroke symptoms  Hematologic: no bruises, no bleeding, no swollen glands  Integument: no lumps, mole changes, nail changes or rash  Endocrine: no malaise/lethargy or unexpected weight changes      Social History     Socioeconomic History    Marital status: UNKNOWN    Number of children: 4   Occupational History    Occupation: retired [employer]    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   Tobacco Use    Smoking status: Former    Smokeless tobacco: Never   Vaping Use    Vaping Use: Never used   Substance and Sexual Activity    Alcohol use: Yes     Alcohol/week: 1.0 standard drink     Types: 1 Glasses of wine per week    Drug use: No    Sexual activity: Yes     Family History   Problem Relation Age of Onset    Heart Disease Mother        OBJECTIVE:    Visit Vitals  BP (!) 170/69 (BP 1 Location: Left upper arm, BP Patient Position: Sitting, BP Cuff Size: Large adult)   Pulse 74   Temp 98.1 °F (36.7 °C) (Oral)   Resp 18   Ht 5' 11.5\" (1.816 m)   Wt 236 lb 1.6 oz (107.1 kg)   SpO2 98%   BMI 32.47 kg/m²     CONSTITUTIONAL: well , well nourished, appears age appropriate  EYES: perrla, eom intact  ENMT:moist mucous membranes, pharynx clear  NECK: supple.  Thyroid normal  RESPIRATORY: Chest: clear to ascultation and percussion   CARDIOVASCULAR: Heart: regular rate and rhythm  GASTROINTESTINAL: Abdomen: soft, bowel sounds active  HEMATOLOGIC: no pathological lymph nodes palpated  MUSCULOSKELETAL: Extremities: no edema, pulse 1+   INTEGUMENT: No unusual rashes or suspicious skin lesions noted. Nails appear normal.  NEUROLOGIC: non-focal exam   MENTAL STATUS: alert and oriented, appropriate affect      ASSESSMENT:  1. Venous insufficiency    2. Essential hypertension    3. 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 Acquired hypothyroidism    5. Benign prostatic hyperplasia without lower urinary tract symptoms    6. Inflammatory osteoarthritis    7. Bruit of right carotid artery    8. Dyslipidemia        PLAN:  1. The patient does have chronic swelling of his lower extremities which is orthostatic in nature related to venous insufficiency. This is not reflective of cardiac decompensation. 2. Blood pressure is excellent today. 3. His diabetes has indeed been doing quite well, but I will await the results of his hemoglobin A1c. He has not had any interventional hypoglycemia. 4. He has a history of hypothyroidism. TSH would be assessed today for efficacy and compliance. 5. His BPH is reasonably stable. He does periodically follow with urologist.  6. He has inflammatory osteoarthritis involving his hands and he takes prednisone 5 mg q.d. 7. He has a right carotid bruit previously noted representing subclinical disease. 8. He remains on his statin in view of his increased cardiovascular risk. He is in a primary risk prevention mode. .  Orders Placed This Encounter    HEMOGLOBIN A1C WITH EAG    MICROALBUMIN, UR, RAND W/ MICROALB/CREAT RATIO    APOLIPOPROTEIN B    CBC WITH AUTOMATED DIFF    CRP, HIGH SENSITIVITY    LIPID PANEL    METABOLIC PANEL, COMPREHENSIVE    PROSTATE SPECIFIC AG    URINALYSIS W/ RFLX MICROSCOPIC    TSH 3RD GENERATION         Follow-up and Dispositions    Return in about 3 months (around 11/30/2022).            [facility], MD

## 2022-09-01 LAB
APPEARANCE UR: ABNORMAL
BACTERIA URNS QL MICRO: NEGATIVE /HPF
BASOPHILS # BLD: 0 K/UL (ref 0–0.1)
BASOPHILS NFR BLD: 1 % (ref 0–1)
BILIRUB UR QL: NEGATIVE
CHOLEST SERPL-MCNC: 112 MG/DL
COLOR UR: ABNORMAL
CREAT UR-MCNC: 293 MG/DL
CRP SERPL HS-MCNC: 1 MG/L
DIFFERENTIAL METHOD BLD: NORMAL
EOSINOPHIL # BLD: 0.1 K/UL (ref 0–0.4)
EOSINOPHIL NFR BLD: 1 % (ref 0–7)
EPITH CASTS URNS QL MICRO: ABNORMAL /LPF
ERYTHROCYTE [DISTWIDTH] IN BLOOD BY AUTOMATED COUNT: 13.4 % (ref 11.5–14.5)
EST. AVERAGE GLUCOSE BLD GHB EST-MCNC: 151 MG/DL
GLUCOSE UR STRIP.AUTO-MCNC: NEGATIVE MG/DL
HBA1C MFR BLD: 6.9 % (ref 4–5.6)
HCT VFR BLD AUTO: 49.1 % (ref 36.6–50.3)
HDLC SERPL-MCNC: 43 MG/DL
HDLC SERPL: 2.6 {RATIO} (ref 0–5)
HGB BLD-MCNC: 15.8 G/DL (ref 12.1–17)
HGB UR QL STRIP: NEGATIVE
HYALINE CASTS URNS QL MICRO: ABNORMAL /LPF (ref 0–5)
IMM GRANULOCYTES # BLD AUTO: 0 K/UL (ref 0–0.04)
IMM GRANULOCYTES NFR BLD AUTO: 0 % (ref 0–0.5)
KETONES UR QL STRIP.AUTO: ABNORMAL MG/DL
LDLC SERPL CALC-MCNC: 55.2 MG/DL (ref 0–100)
LEUKOCYTE ESTERASE UR QL STRIP.AUTO: NEGATIVE
LYMPHOCYTES # BLD: 2 K/UL (ref 0.8–3.5)
LYMPHOCYTES NFR BLD: 24 % (ref 12–49)
MCH RBC QN AUTO: 29.3 PG (ref 26–34)
MCHC RBC AUTO-ENTMCNC: 32.2 G/DL (ref 30–36.5)
MCV RBC AUTO: 91.1 FL (ref 80–99)
MICROALBUMIN UR-MCNC: 2 MG/DL
MICROALBUMIN/CREAT UR-RTO: 7 MG/G (ref 0–30)
MONOCYTES # BLD: 0.6 K/UL (ref 0–1)
MONOCYTES NFR BLD: 7 % (ref 5–13)
NEUTS SEG # BLD: 5.8 K/UL (ref 1.8–8)
NEUTS SEG NFR BLD: 67 % (ref 32–75)
NITRITE UR QL STRIP.AUTO: NEGATIVE
NRBC # BLD: 0 K/UL (ref 0–0.01)
NRBC BLD-RTO: 0 PER 100 WBC
PH UR STRIP: 5.5 [PH] (ref 5–8)
PLATELET # BLD AUTO: 300 K/UL (ref 150–400)
PMV BLD AUTO: 9.2 FL (ref 8.9–12.9)
PROT UR STRIP-MCNC: NEGATIVE MG/DL
PSA SERPL-MCNC: 0.5 NG/ML (ref 0.01–4)
RBC # BLD AUTO: 5.39 M/UL (ref 4.1–5.7)
RBC #/AREA URNS HPF: ABNORMAL /HPF (ref 0–5)
SP GR UR REFRACTOMETRY: 1.02 (ref 1–1.03)
TRIGL SERPL-MCNC: 69 MG/DL (ref ?–150)
TSH SERPL DL<=0.05 MIU/L-ACNC: 1.88 UIU/ML (ref 0.36–3.74)
UROBILINOGEN UR QL STRIP.AUTO: 0.2 EU/DL (ref 0.2–1)
VLDLC SERPL CALC-MCNC: 13.8 MG/DL
WBC # BLD AUTO: 8.6 K/UL (ref 4.1–11.1)
WBC URNS QL MICRO: ABNORMAL /HPF (ref 0–4)

## 2022-09-02 LAB — APO B SERPL-MCNC: 66 MG/DL

## 2022-09-05 RX ORDER — POTASSIUM CHLORIDE 20 MEQ/1
20 TABLET, EXTENDED RELEASE ORAL 2 TIMES DAILY
Qty: 6 TABLET | Refills: 0 | Status: SHIPPED | OUTPATIENT
Start: 2022-09-05 | End: 2022-09-08

## 2022-09-08 ENCOUNTER — TELEPHONE (OUTPATIENT)
Dept: INTERNAL MEDICINE CLINIC | Age: 72
End: 2022-09-08

## 2022-09-08 NOTE — TELEPHONE ENCOUNTER
----- Message from Elisa Moore MD sent at 9/5/2022  2:26 PM EDT -----  Potassium is low therefore replete. Return to office in 1 month for a repeat BMP.

## 2022-09-30 DIAGNOSIS — I10 ESSENTIAL HYPERTENSION: Primary | ICD-10-CM

## 2023-01-09 ENCOUNTER — OFFICE VISIT (OUTPATIENT)
Dept: INTERNAL MEDICINE CLINIC | Age: 73
End: 2023-01-09
Payer: MEDICARE

## 2023-01-09 VITALS
OXYGEN SATURATION: 98 % | SYSTOLIC BLOOD PRESSURE: 139 MMHG | BODY MASS INDEX: 33.63 KG/M2 | WEIGHT: 234.9 LBS | HEIGHT: 70 IN | HEART RATE: 70 BPM | RESPIRATION RATE: 18 BRPM | DIASTOLIC BLOOD PRESSURE: 71 MMHG | TEMPERATURE: 97.5 F

## 2023-01-09 DIAGNOSIS — E66.9 OBESITY (BMI 30.0-34.9): ICD-10-CM

## 2023-01-09 DIAGNOSIS — M19.90 INFLAMMATORY OSTEOARTHRITIS: ICD-10-CM

## 2023-01-09 DIAGNOSIS — E11.3393 CONTROLLED TYPE 2 DIABETES MELLITUS WITH BOTH EYES AFFECTED BY MODERATE NONPROLIFERATIVE RETINOPATHY WITHOUT MACULAR EDEMA, WITH LONG-TERM CURRENT USE OF INSULIN (HCC): ICD-10-CM

## 2023-01-09 DIAGNOSIS — E03.9 ACQUIRED HYPOTHYROIDISM: ICD-10-CM

## 2023-01-09 DIAGNOSIS — E78.5 DYSLIPIDEMIA: ICD-10-CM

## 2023-01-09 DIAGNOSIS — I10 ESSENTIAL HYPERTENSION: Primary | ICD-10-CM

## 2023-01-09 DIAGNOSIS — Z79.4 CONTROLLED TYPE 2 DIABETES MELLITUS WITH BOTH EYES AFFECTED BY MODERATE NONPROLIFERATIVE RETINOPATHY WITHOUT MACULAR EDEMA, WITH LONG-TERM CURRENT USE OF INSULIN (HCC): ICD-10-CM

## 2023-01-09 NOTE — PROGRESS NOTES
[address] [facility]  [place] [address]  [address] [facility] [address]  Phone:  [phone]  Fax: [phone]       Chief Complaint   Patient presents with    Follow-up    Diabetes    Hypertension     Patient here for follow up diabetes and high blood pressure. He also reports he need medication refills. .      SUBJECTIVE:    Edgard Perales is a 71 y.o. male comes in for return visit stating that he has done reasonably well. Blood sugars have been excellent. He has been taking his pre-mixed insulin 37 units twice a day with breakfast and dinner. He has not had any interventional hypoglycemia. He did not give any specific numbers for blood sugars however. He has inflammatory osteoarthritis for which I placed him on prednisone 5 mg. It helped significantly. He wants to now stop the prednisone. He has a past history of primary hypertension, dyslipidemia, as well as obesity along with hypothyroidism. He is compliant with all of his medication. He exercises on a fairly regular basis often times three times a week at a gym. Current Outpatient Medications   Medication Sig Dispense Refill    insulin syringe-needle U-100 1/2 mL 31 gauge x 15/64\" syrg USE 1 SYRINGE TWICE DAILY. DX.E11.9 60 Pen Needle 11    metFORMIN ER (GLUCOPHAGE XR) 500 mg tablet Take 1 Tablet by mouth two (2) times daily (with meals). 180 Tablet 3    HumuLIN 70/30 U-100 Insulin 100 unit/mL (70-30) injection INJECT 36 UNITS BEFORE BREAKFAST AND DINNER      levothyroxine (SYNTHROID) 100 mcg tablet Take 1 Tablet by mouth Daily (before breakfast). 90 Tablet 3    atorvastatin (LIPITOR) 40 mg tablet Take 1 Tablet by mouth daily. 90 Tablet 3    lisinopriL (PRINIVIL, ZESTRIL) 20 mg tablet Take 1 Tablet by mouth daily. 90 Tablet 3    Insulin Needles, Disposable, 31 gauge x 5/16\" ndle Use to inject insulin twice a day. Dx.e11.9 100 Each 11    predniSONE (DELTASONE) 5 mg tablet Take 1 Tablet by mouth daily.  [address] Tablet 3    Blood Glucose Control, Low (True Metrix Level 1) soln Use to calibrate glucometer weekly 1 Each 3    alcohol swabs (BD Single Use Swabs Regular) padm Use to test blood sugar three times. 300 Pad 3    lancets (FreeStyle Lancets) 28 gauge misc USE 1  TO CHECK GLUCOSE THREE TIMES DAILY 100 Lancet 11    glucose blood VI test strips (FreeStyle Lite Strips) strip USE 1 STRIP TO CHECK GLUCOSE THREE TIMES DAILY 100 Strip 11    blood-glucose meter (FREESTYLE LITE METER) by Does Not Apply route. blood sugar diagnostic (FREESTYLE LITE STRIPS) by In Vitro route. co-enzyme Q-10 (CO Q-10) 100 mg capsule Take 100 mg by mouth daily. aspirin 81 mg chewable tablet Take 81 mg by mouth daily. multivit,tx with iron,minerals (COMPLETE MULTIVITAMIN PO) Take 1 Tab by mouth daily. insulin nph-regular human rec (HumuLIN 70/30 U-100 KwikPen) 100 unit/mL (70-30) inpn 36 Units by SubCUTAneous route Before breakfast and dinner. (Patient not taking: Reported on 1/9/2023) 8 Adjustable Dose Pre-filled Pen Syringe 11    cyclobenzaprine (FLEXERIL) 5 mg tablet Take 1 Tablet by mouth nightly.  (Patient not taking: Reported on 1/9/2023)      brimonidine (ALPHAGAN) 0.2 % ophthalmic solution INSTILL 1 DROP INTO RIGHT EYE TWICE DAILY (Patient not taking: Reported on 1/9/2023)      latanoprost (XALATAN) 0.005 % ophthalmic solution INSTILL 1 DROP INTO RIGHT EYE ONCE DAILY AT BEDTIME (Patient not taking: Reported on 1/9/2023)       Past Medical History:   Diagnosis Date    Diabetes ([address]     Hypertension      Past Surgical History:   Procedure Laterality Date    HX COLONOSCOPY  2015    Joe----unremarkable    HX HEENT      laser for retinopathy    HX ORTHOPAEDIC      achilles tendon surgery    HX REFRACTIVE SURGERY      HX UROLOGICAL      penile implant    FL UNLISTED PROCEDURE ABDOMEN PERITONEUM & OMENTUM      hemmoroidectomy     No Known Allergies      REVIEW OF SYSTEMS:  General: negative for - chills or fever  ENT: negative for - headaches, nasal congestion or tinnitus  Respiratory: negative for - cough, hemoptysis, shortness of breath or wheezing  Cardiovascular : negative for - chest pain, edema, palpitations or shortness of breath  Gastrointestinal: negative for - abdominal pain, blood in stools, heartburn or nausea/vomiting  Genito-Urinary: no dysuria, trouble voiding, or hematuria  Musculoskeletal: negative for - gait disturbance, joint pain, joint stiffness or joint swelling  Neurological: no TIA or stroke symptoms  Hematologic: no bruises, no bleeding, no swollen glands  Integument: no lumps, mole changes, nail changes or rash  Endocrine: no malaise/lethargy or unexpected weight changes      Social History     Socioeconomic History    Marital status: UNKNOWN    Number of children: 4   Occupational History    Occupation: retired fro [employer]    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   Tobacco Use    Smoking status: Former    Smokeless tobacco: Never   Vaping Use    Vaping Use: Never used   Substance and Sexual Activity    Alcohol use: Yes     Alcohol/week: 1.0 standard drink     Types: 1 Glasses of wine per week    Drug use: No    Sexual activity: Yes     Family History   Problem Relation Age of Onset    Heart Disease Mother        OBJECTIVE:    Visit Vitals  /71   Pulse 70   Temp 97.5 °F (36.4 °C) (Oral)   Resp 18   Ht 5' 10\" (1.778 m)   Wt 234 lb 14.4 oz (106.5 kg)   SpO2 98%   BMI 33.70 kg/m²     CONSTITUTIONAL: well , well nourished, appears age appropriate  EYES: perrla, eom intact  ENMT:moist mucous membranes, pharynx clear  NECK: supple. Thyroid normal  RESPIRATORY: Chest: clear to ascultation and percussion   CARDIOVASCULAR: Heart: regular rate and rhythm  GASTROINTESTINAL: Abdomen: soft, bowel sounds active  HEMATOLOGIC: no pathological lymph nodes palpated  MUSCULOSKELETAL: Extremities: no edema, pulse 1+   INTEGUMENT: No unusual rashes or suspicious skin lesions noted.  Nails appear normal.  NEUROLOGIC: non-focal exam   MENTAL STATUS: alert and oriented, appropriate affect      ASSESSMENT:  1. Essential hypertension    2. Acquired hypothyroidism    3. 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 Inflammatory osteoarthritis    5. Dyslipidemia    6. Obesity (BMI 30.0-34. 9)        PLAN:  1. The patient's blood pressure is excellent, no adjustments are made. 2. He will continue his thyroid supplement. Efficacy and compliance will be assessed. 3. Hopefully his diabetes is doing well, but I will await the results of his haemoglobin A1c. His last value was excellent. 4. His inflammatory osteoarthritis is doing quite well. He will discontinue the prednisone for now, but I explained to him that the likelihood of flare up is quite high. 5. He remains on his statin and efficacy and compliance will be assessed. His overall cardiovascular risk is relatively low with statin usage made for blood pressure control. 6. He needs to minimize weight gain. This can be accomplished by eating meals, eliminating snacks, and avoiding the consumption of processed carbohydrates. .  Orders Placed This Encounter    HEMOGLOBIN A1C WITH EAG    MICROALBUMIN, UR, RAND W/ MICROALB/CREAT RATIO    APOLIPOPROTEIN B    TSH 3RD GENERATION         Follow-up and Dispositions    Return in about 3 months (around 4/9/2023).            Brianna Villa MD

## 2023-01-09 NOTE — PROGRESS NOTES
Edgard Perales is a 71 y.o. male  Chief Complaint   Patient presents with    Follow-up    Diabetes    Hypertension     Patient here for follow up diabetes and high blood pressure. He also reports he need medication refills. 1. Have you been to the ER, urgent care clinic since your last visit? Hospitalized since your last visit? Yes When: November 2022 Where: [facility] Reason for visit: Abdominal Scan    2. Have you seen or consulted any other health care providers outside of the [address] [place] [address] since your last visit? Include any pap smears or colon screening.  No

## 2023-01-10 LAB
CREAT UR-MCNC: 259 MG/DL
EST. AVERAGE GLUCOSE BLD GHB EST-MCNC: 166 MG/DL
HBA1C MFR BLD: 7.4 % (ref 4–5.6)
MICROALBUMIN UR-MCNC: 2.66 MG/DL
MICROALBUMIN/CREAT UR-RTO: 10 MG/G (ref 0–30)
TSH SERPL DL<=0.05 MIU/L-ACNC: 0.76 UIU/ML (ref 0.36–3.74)

## 2023-01-13 LAB — APO B SERPL-MCNC: 90 MG/DL

## 2023-02-06 RX ORDER — INSULIN HUMAN 100 [IU]/ML
INJECTION, SUSPENSION SUBCUTANEOUS
Qty: 30 ML | Refills: 5 | Status: SHIPPED | OUTPATIENT
Start: 2023-02-06

## 2023-03-03 DIAGNOSIS — E78.5 DYSLIPIDEMIA: ICD-10-CM

## 2023-03-04 RX ORDER — BLOOD SUGAR DIAGNOSTIC
STRIP MISCELLANEOUS
Qty: 300 STRIP | Refills: 3 | Status: SHIPPED | OUTPATIENT
Start: 2023-03-04

## 2023-03-04 RX ORDER — LEVOTHYROXINE SODIUM 100 UG/1
100 TABLET ORAL
Qty: 90 TABLET | Refills: 3 | Status: SHIPPED | OUTPATIENT
Start: 2023-03-04

## 2023-03-04 RX ORDER — LISINOPRIL 20 MG/1
20 TABLET ORAL DAILY
Qty: 90 TABLET | Refills: 3 | Status: SHIPPED | OUTPATIENT
Start: 2023-03-04

## 2023-03-04 RX ORDER — BLOOD-GLUCOSE METER
EACH MISCELLANEOUS
Qty: 300 EACH | Refills: 3 | Status: SHIPPED | OUTPATIENT
Start: 2023-03-04

## 2023-03-04 RX ORDER — LANCETS
EACH MISCELLANEOUS
Qty: 300 EACH | Refills: 3 | Status: SHIPPED | OUTPATIENT
Start: 2023-03-04

## 2023-03-04 RX ORDER — ISOPROPYL ALCOHOL 70 ML/100ML
SWAB TOPICAL
Qty: 300 PAD | Refills: 3 | Status: SHIPPED | OUTPATIENT
Start: 2023-03-04

## 2023-03-04 RX ORDER — ATORVASTATIN CALCIUM 40 MG/1
40 TABLET, FILM COATED ORAL DAILY
Qty: 90 TABLET | Refills: 3 | Status: SHIPPED | OUTPATIENT
Start: 2023-03-04

## 2023-04-03 RX ORDER — SYRINGE AND NEEDLE,INSULIN,1ML 31GX15/64"
SYRINGE, EMPTY DISPOSABLE MISCELLANEOUS
Qty: 200 PEN NEEDLE | Refills: 3 | Status: SHIPPED | OUTPATIENT
Start: 2023-04-03

## 2023-05-08 ENCOUNTER — NURSE TRIAGE (OUTPATIENT)
Dept: OTHER | Facility: CLINIC | Age: 73
End: 2023-05-08

## 2023-05-08 NOTE — TELEPHONE ENCOUNTER
Location of patient: [facility] [facility] [address] call from Darrel KWONG at [facility] with [employer]. Subjective: Caller states feet ankles legs swollen with pain     Current Symptoms: swelling feet ankles to mid lower leg R>L ,color is darker at ankles and redness of the instep ,painful     Onset: 1 week ago     Associated Symptoms: NA    Pain Severity: 6/10 legs     Temperature: Denies    What has been tried: Tylenol     Recommended disposition: Go to ED/UCC Now (Or to Office with PCP Approval)    Care advice provided, patient verbalizes understanding; denies any other questions or concerns; instructed to call back for any new or worsening symptoms. Writer provided warm transfer to [facility] at [address] [facility] and [facility] AND WOMEN'[facility]  for 2ND Level triage     Attention Provider: Thank you for allowing me to participate in the care of your patient. The patient was connected to triage in response to information provided to the ECC/PSC. Please do not respond through this encounter as the response is not directed to a shared pool.       Reason for Disposition   Thigh, calf, or ankle swelling in both legs, but one side is definitely more swollen (Exception: longstanding difference between legs)    Protocols used: Leg Swelling and Edema-ADULT-[place]

## 2023-05-11 DIAGNOSIS — E78.5 HYPERLIPIDEMIA, UNSPECIFIED: ICD-10-CM

## 2023-05-11 RX ORDER — ATORVASTATIN CALCIUM 40 MG/1
TABLET, FILM COATED ORAL
Qty: 90 TABLET | Refills: 3 | Status: SHIPPED | OUTPATIENT
Start: 2023-05-11

## 2023-05-18 RX ORDER — CYCLOBENZAPRINE HCL 5 MG
1 TABLET ORAL NIGHTLY
COMMUNITY
Start: 2021-10-04

## 2023-05-18 RX ORDER — LISINOPRIL 20 MG/1
20 TABLET ORAL DAILY
COMMUNITY
Start: 2023-03-04

## 2023-05-18 RX ORDER — ASPIRIN 81 MG/1
81 TABLET, CHEWABLE ORAL DAILY
COMMUNITY
End: 2023-06-03 | Stop reason: SDUPTHER

## 2023-05-18 RX ORDER — UBIDECARENONE 100 MG
100 CAPSULE ORAL DAILY
COMMUNITY

## 2023-05-18 RX ORDER — INSULIN HUMAN 100 [IU]/ML
36 INJECTION, SUSPENSION SUBCUTANEOUS
COMMUNITY
Start: 2022-04-23

## 2023-05-18 RX ORDER — BRIMONIDINE TARTRATE 2 MG/ML
SOLUTION/ DROPS OPHTHALMIC
COMMUNITY
Start: 2021-03-17

## 2023-05-18 RX ORDER — INSULIN ASPART 100 [IU]/ML
INJECTION, SUSPENSION SUBCUTANEOUS
COMMUNITY
Start: 2023-03-19

## 2023-05-18 RX ORDER — METFORMIN HYDROCHLORIDE 500 MG/1
500 TABLET, EXTENDED RELEASE ORAL 2 TIMES DAILY WITH MEALS
COMMUNITY
Start: 2023-03-19

## 2023-05-18 RX ORDER — PREDNISONE 5 MG/1
5 TABLET ORAL DAILY
COMMUNITY
Start: 2022-04-02

## 2023-05-18 RX ORDER — LATANOPROST 50 UG/ML
SOLUTION/ DROPS OPHTHALMIC
COMMUNITY
Start: 2021-05-02

## 2023-05-18 RX ORDER — LEVOTHYROXINE SODIUM 0.1 MG/1
100 TABLET ORAL
COMMUNITY
Start: 2023-03-04

## 2023-05-26 ENCOUNTER — OFFICE VISIT (OUTPATIENT)
Facility: CLINIC | Age: 73
End: 2023-05-26

## 2023-05-26 DIAGNOSIS — I10 ESSENTIAL HYPERTENSION: ICD-10-CM

## 2023-05-26 DIAGNOSIS — Z09 HOSPITAL DISCHARGE FOLLOW-UP: ICD-10-CM

## 2023-05-26 DIAGNOSIS — E11.3393 CONTROLLED TYPE 2 DIABETES MELLITUS WITH BOTH EYES AFFECTED BY MODERATE NONPROLIFERATIVE RETINOPATHY WITHOUT MACULAR EDEMA, WITH LONG-TERM CURRENT USE OF INSULIN (HCC): ICD-10-CM

## 2023-05-26 DIAGNOSIS — I87.2 VENOUS INSUFFICIENCY: ICD-10-CM

## 2023-05-26 DIAGNOSIS — Z79.4 CONTROLLED TYPE 2 DIABETES MELLITUS WITH BOTH EYES AFFECTED BY MODERATE NONPROLIFERATIVE RETINOPATHY WITHOUT MACULAR EDEMA, WITH LONG-TERM CURRENT USE OF INSULIN (HCC): ICD-10-CM

## 2023-05-26 DIAGNOSIS — E03.9 ACQUIRED HYPOTHYROIDISM: ICD-10-CM

## 2023-05-26 DIAGNOSIS — R60.9 EDEMA, UNSPECIFIED TYPE: Primary | ICD-10-CM

## 2023-05-26 ASSESSMENT — PATIENT HEALTH QUESTIONNAIRE - PHQ9
SUM OF ALL RESPONSES TO PHQ QUESTIONS 1-9: 0
SUM OF ALL RESPONSES TO PHQ QUESTIONS 1-9: 0
2. FEELING DOWN, DEPRESSED OR HOPELESS: 0
SUM OF ALL RESPONSES TO PHQ QUESTIONS 1-9: 0
SUM OF ALL RESPONSES TO PHQ9 QUESTIONS 1 & 2: 0
1. LITTLE INTEREST OR PLEASURE IN DOING THINGS: 0
SUM OF ALL RESPONSES TO PHQ QUESTIONS 1-9: 0
2. FEELING DOWN, DEPRESSED OR HOPELESS: 0
SUM OF ALL RESPONSES TO PHQ QUESTIONS 1-9: 0
SUM OF ALL RESPONSES TO PHQ QUESTIONS 1-9: 0
SUM OF ALL RESPONSES TO PHQ9 QUESTIONS 1 & 2: 0
SUM OF ALL RESPONSES TO PHQ QUESTIONS 1-9: 0
SUM OF ALL RESPONSES TO PHQ QUESTIONS 1-9: 0
1. LITTLE INTEREST OR PLEASURE IN DOING THINGS: 0

## 2023-05-26 ASSESSMENT — LIFESTYLE VARIABLES
HOW MANY STANDARD DRINKS CONTAINING ALCOHOL DO YOU HAVE ON A TYPICAL DAY: 1 OR 2
HOW OFTEN DO YOU HAVE A DRINK CONTAINING ALCOHOL: 2-3 TIMES A WEEK

## 2023-05-26 NOTE — PROGRESS NOTES
Chief Complaint   Patient presents with    Follow-up    Medicare AWV     Jeanette Montes is a 68 y.o. male  Chief Complaint   Patient presents with    Follow-up    Medicare AWV         YES Answers must have Comments  1. \"Have you been to the ER, urgent care clinic since your last visit? Hospitalized since your last visit? \"    [x] YES Where: [facility] ed Reason for visit: Swelling in feet  [] NO       2. Have you seen or consulted any other health care providers outside of [address] [place] [address] since your last visit?     [] YES   [x] NO       3. For patients aged 39-70: Have you had a colorectal cancer screening such as a colonoscopy/FIT/Cologuard? Nurse/CMA to request records if not in chart   [x] YES When: 2021  [] NO   [] NA, based on age    If the patient is female:      4. For female patients aged 41-77: Vish Castillo you had a mammogram in the last two years?  Nurse/CMA to request records if not in chart   [] YES   [] NO   [] NA, based on age    11. For female patients aged 21-65: Vish Jonathan you had a pap smear?   Nurse/CMA to request records if not in chart   [] YES   [] NO  [] NA, based on age

## 2023-05-27 VITALS
BODY MASS INDEX: 34.67 KG/M2 | TEMPERATURE: 97.6 F | OXYGEN SATURATION: 98 % | WEIGHT: 242.2 LBS | SYSTOLIC BLOOD PRESSURE: 110 MMHG | DIASTOLIC BLOOD PRESSURE: 70 MMHG | HEIGHT: 70 IN | RESPIRATION RATE: 17 BRPM

## 2023-05-27 LAB
ANION GAP SERPL CALC-SCNC: 1 MMOL/L (ref 5–15)
BUN SERPL-MCNC: 13 MG/DL (ref 6–20)
BUN/CREAT SERPL: 12 (ref 12–20)
CALCIUM SERPL-MCNC: 8.8 MG/DL (ref 8.5–10.1)
CHLORIDE SERPL-SCNC: 110 MMOL/L (ref 97–108)
CO2 SERPL-SCNC: 30 MMOL/L (ref 21–32)
CREAT SERPL-MCNC: 1.08 MG/DL (ref 0.7–1.3)
EST. AVERAGE GLUCOSE BLD GHB EST-MCNC: 140 MG/DL
GLUCOSE SERPL-MCNC: 53 MG/DL (ref 65–100)
HBA1C MFR BLD: 6.5 % (ref 4–5.6)
NT PRO BNP: 101 PG/ML
POTASSIUM SERPL-SCNC: 3.9 MMOL/L (ref 3.5–5.1)
SODIUM SERPL-SCNC: 141 MMOL/L (ref 136–145)

## 2023-05-27 NOTE — PROGRESS NOTES
[address] [place] [address] and Primary Care  [place] [address]  [address] [place] [address]  Phone:  [phone]  Fax: [phone]       Chief Complaint   Patient presents with    Follow-up    Medicare AW   . SUBJECTIVE:    Manjit Javier is a 68 y.o. male comes in having been seen in the emergency room at [facility].  He went because he noted more swelling than usual in his lower extremities. The orthostatic component was not minimal.  He had a venous Doppler study done which was predictably negative. He has no pain in his lower extremities. He is not taking any new medication. He denies any dyspnea on exertion orthopnea or PND. He states that his diabetes is doing reasonably well. He is currently taking premixed insulin 36 units before breakfast and dinner. He was quite vague about blood sugars however. He has a past history of primary hypertension as well as dyslipidemia and obesity. .       Current Outpatient Medications   Medication Sig Dispense Refill    aspirin 81 MG chewable tablet Take 1 tablet by mouth daily      coenzyme Q10 100 MG CAPS capsule Take 1 capsule by mouth daily      insulin aspart protamine-insulin aspart (NOVOLOG 70/30) (70-30) 100 UNIT/ML injection Inject 36 units twice a day      levothyroxine (SYNTHROID) 100 MCG tablet Take 1 tablet by mouth every morning (before breakfast)      lisinopril (PRINIVIL;ZESTRIL) 20 MG tablet Take 1 tablet by mouth daily      metFORMIN (GLUCOPHAGE-XR) 500 MG extended release tablet Take 1 tablet by mouth 2 times daily (with meals)      atorvastatin (LIPITOR) 40 MG tablet TAKE 1 TABLET BY MOUTH EVERY DAY 90 tablet 3    brimonidine (ALPHAGAN) 0.2 % ophthalmic solution INSTILL 1 DROP INTO RIGHT EYE TWICE DAILY (Patient not taking: Reported on 5/26/2023)      cyclobenzaprine (FLEXERIL) 5 MG tablet Take 1 tablet by mouth nightly (Patient not taking: Reported on 5/26/2023)      insulin 70-30 (HUMULIN;NOVOLIN) (70-30) 100 UNIT per ML Normal vision: sees adequately in most situations; can see medication labels, newsprint

## 2023-06-02 ENCOUNTER — OFFICE VISIT (OUTPATIENT)
Facility: CLINIC | Age: 73
End: 2023-06-02

## 2023-06-02 VITALS
TEMPERATURE: 98 F | RESPIRATION RATE: 16 BRPM | SYSTOLIC BLOOD PRESSURE: 142 MMHG | HEIGHT: 70 IN | OXYGEN SATURATION: 98 % | WEIGHT: 239.9 LBS | DIASTOLIC BLOOD PRESSURE: 77 MMHG | BODY MASS INDEX: 34.35 KG/M2 | HEART RATE: 65 BPM

## 2023-06-02 DIAGNOSIS — Z00.00 MEDICARE ANNUAL WELLNESS VISIT, SUBSEQUENT: ICD-10-CM

## 2023-06-02 DIAGNOSIS — I87.2 VENOUS INSUFFICIENCY: Primary | ICD-10-CM

## 2023-06-02 DIAGNOSIS — E66.9 OBESITY (BMI 30.0-34.9): ICD-10-CM

## 2023-06-02 DIAGNOSIS — I10 ESSENTIAL HYPERTENSION: ICD-10-CM

## 2023-06-02 DIAGNOSIS — E11.3393 CONTROLLED TYPE 2 DIABETES MELLITUS WITH BOTH EYES AFFECTED BY MODERATE NONPROLIFERATIVE RETINOPATHY WITHOUT MACULAR EDEMA, WITH LONG-TERM CURRENT USE OF INSULIN (HCC): ICD-10-CM

## 2023-06-02 DIAGNOSIS — E78.5 DYSLIPIDEMIA: ICD-10-CM

## 2023-06-02 DIAGNOSIS — Z79.4 CONTROLLED TYPE 2 DIABETES MELLITUS WITH BOTH EYES AFFECTED BY MODERATE NONPROLIFERATIVE RETINOPATHY WITHOUT MACULAR EDEMA, WITH LONG-TERM CURRENT USE OF INSULIN (HCC): ICD-10-CM

## 2023-06-02 SDOH — ECONOMIC STABILITY: FOOD INSECURITY: WITHIN THE PAST 12 MONTHS, THE FOOD YOU BOUGHT JUST DIDN'T LAST AND YOU DIDN'T HAVE MONEY TO GET MORE.: NEVER TRUE

## 2023-06-02 SDOH — HEALTH STABILITY: PHYSICAL HEALTH: ON AVERAGE, HOW MANY MINUTES DO YOU ENGAGE IN EXERCISE AT THIS LEVEL?: 0 MIN

## 2023-06-02 SDOH — ECONOMIC STABILITY: TRANSPORTATION INSECURITY
IN THE PAST 12 MONTHS, HAS LACK OF TRANSPORTATION KEPT YOU FROM MEETINGS, WORK, OR FROM GETTING THINGS NEEDED FOR DAILY LIVING?: NO

## 2023-06-02 SDOH — HEALTH STABILITY: PHYSICAL HEALTH: ON AVERAGE, HOW MANY DAYS PER WEEK DO YOU ENGAGE IN MODERATE TO STRENUOUS EXERCISE (LIKE A BRISK WALK)?: 0 DAYS

## 2023-06-02 SDOH — ECONOMIC STABILITY: HOUSING INSECURITY
IN THE LAST 12 MONTHS, WAS THERE A TIME WHEN YOU DID NOT HAVE A STEADY PLACE TO SLEEP OR SLEPT IN A SHELTER (INCLUDING NOW)?: NO

## 2023-06-02 SDOH — ECONOMIC STABILITY: TRANSPORTATION INSECURITY
IN THE PAST 12 MONTHS, HAS THE LACK OF TRANSPORTATION KEPT YOU FROM MEDICAL APPOINTMENTS OR FROM GETTING MEDICATIONS?: NO

## 2023-06-02 SDOH — ECONOMIC STABILITY: FOOD INSECURITY: WITHIN THE PAST 12 MONTHS, YOU WORRIED THAT YOUR FOOD WOULD RUN OUT BEFORE YOU GOT MONEY TO BUY MORE.: NEVER TRUE

## 2023-06-02 SDOH — ECONOMIC STABILITY: HOUSING INSECURITY: IN THE LAST 12 MONTHS, HOW MANY PLACES HAVE YOU LIVED?: 1

## 2023-06-02 SDOH — ECONOMIC STABILITY: INCOME INSECURITY: IN THE LAST 12 MONTHS, WAS THERE A TIME WHEN YOU WERE NOT ABLE TO PAY THE MORTGAGE OR RENT ON TIME?: NO

## 2023-06-02 ASSESSMENT — LIFESTYLE VARIABLES
HOW MANY STANDARD DRINKS CONTAINING ALCOHOL DO YOU HAVE ON A TYPICAL DAY: 1 OR 2
HOW OFTEN DO YOU HAVE A DRINK CONTAINING ALCOHOL: 4 OR MORE TIMES A WEEK

## 2023-06-02 ASSESSMENT — ANXIETY QUESTIONNAIRES
IF YOU CHECKED OFF ANY PROBLEMS ON THIS QUESTIONNAIRE, HOW DIFFICULT HAVE THESE PROBLEMS MADE IT FOR YOU TO DO YOUR WORK, TAKE CARE OF THINGS AT HOME, OR GET ALONG WITH OTHER PEOPLE: NOT DIFFICULT AT ALL
2. NOT BEING ABLE TO STOP OR CONTROL WORRYING: 0
3. WORRYING TOO MUCH ABOUT DIFFERENT THINGS: 0
6. BECOMING EASILY ANNOYED OR IRRITABLE: 0
5. BEING SO RESTLESS THAT IT IS HARD TO SIT STILL: 0
4. TROUBLE RELAXING: 0
7. FEELING AFRAID AS IF SOMETHING AWFUL MIGHT HAPPEN: 0
GAD7 TOTAL SCORE: 0
1. FEELING NERVOUS, ANXIOUS, OR ON EDGE: 0

## 2023-06-02 ASSESSMENT — SOCIAL DETERMINANTS OF HEALTH (SDOH)
IN A TYPICAL WEEK, HOW MANY TIMES DO YOU TALK ON THE PHONE WITH FAMILY, FRIENDS, OR NEIGHBORS?: MORE THAN THREE TIMES A WEEK
HOW HARD IS IT FOR YOU TO PAY FOR THE VERY BASICS LIKE FOOD, HOUSING, MEDICAL CARE, AND HEATING?: NOT HARD AT ALL
HOW OFTEN DO YOU ATTEND CHURCH OR RELIGIOUS SERVICES?: MORE THAN 4 TIMES PER YEAR
WITHIN THE LAST YEAR, HAVE TO BEEN RAPED OR FORCED TO HAVE ANY KIND OF SEXUAL ACTIVITY BY YOUR PARTNER OR EX-PARTNER?: NO
DO YOU BELONG TO ANY CLUBS OR ORGANIZATIONS SUCH AS CHURCH GROUPS UNIONS, FRATERNAL OR ATHLETIC GROUPS, OR SCHOOL GROUPS?: YES
WITHIN THE LAST YEAR, HAVE YOU BEEN HUMILIATED OR EMOTIONALLY ABUSED IN OTHER WAYS BY YOUR PARTNER OR EX-PARTNER?: NO
WITHIN THE LAST YEAR, HAVE YOU BEEN AFRAID OF YOUR PARTNER OR EX-PARTNER?: NO
WITHIN THE LAST YEAR, HAVE YOU BEEN KICKED, HIT, SLAPPED, OR OTHERWISE PHYSICALLY HURT BY YOUR PARTNER OR EX-PARTNER?: NO
HOW OFTEN DO YOU GET TOGETHER WITH FRIENDS OR RELATIVES?: THREE TIMES A WEEK
HOW OFTEN DO YOU ATTENT MEETINGS OF THE CLUB OR ORGANIZATION YOU BELONG TO?: MORE THAN 4 TIMES PER YEAR

## 2023-06-02 ASSESSMENT — PATIENT HEALTH QUESTIONNAIRE - PHQ9
SUM OF ALL RESPONSES TO PHQ QUESTIONS 1-9: 0
SUM OF ALL RESPONSES TO PHQ9 QUESTIONS 1 & 2: 0
1. LITTLE INTEREST OR PLEASURE IN DOING THINGS: 0
2. FEELING DOWN, DEPRESSED OR HOPELESS: 0

## 2023-06-02 NOTE — PROGRESS NOTES
[address] [place] [address] and Primary Care  [place] [address]  [address]  [facility] [address]  Phone:  [phone]  Fax: [phone]       Chief Complaint   Patient presents with    Medicare AWV     Patient here for Annual Wellness Exam.    .      SUBJECTIVE:    Chapis William is a 68 y.o. male comes in for follow-up of the edema. He has noted a decrease since I last saw him. Additionally he is lost about 4 pounds. .  I attributed this to venous insufficiency. No evidence of DVT from the emergency room or evidence of any cardiac renal or hepatic dysfunction. He most recently went to the emergency room because of an acute urticarial development. He was treated symptomatically and it abated suggesting that he probably consumes something that he should not have. He has been reviewed right lower limb bending thing from the his estimate takes time. He has a past history of primary hypertension dyslipidemia and diabetes mellitus.            Current Outpatient Medications   Medication Sig Dispense Refill    aspirin 81 MG chewable tablet Take 1 tablet by mouth daily      coenzyme Q10 100 MG CAPS capsule Take 1 capsule by mouth daily      insulin aspart protamine-insulin aspart (NOVOLOG 70/30) (70-30) 100 UNIT/ML injection Inject 36 units twice a day      levothyroxine (SYNTHROID) 100 MCG tablet Take 1 tablet by mouth every morning (before breakfast)      lisinopril (PRINIVIL;ZESTRIL) 20 MG tablet Take 1 tablet by mouth daily      metFORMIN (GLUCOPHAGE-XR) 500 MG extended release tablet Take 1 tablet by mouth 2 times daily (with meals)      atorvastatin (LIPITOR) 40 MG tablet TAKE 1 TABLET BY MOUTH EVERY DAY 90 tablet 3    brimonidine (ALPHAGAN) 0.2 % ophthalmic solution INSTILL 1 DROP INTO RIGHT EYE TWICE DAILY (Patient not taking: Reported on 5/26/2023)      cyclobenzaprine (FLEXERIL) 5 MG tablet Take 1 tablet by mouth nightly (Patient not taking: Reported on 5/26/2023)      insulin 70-30 (HUMULIN;NOVOLIN) (70-30)

## 2023-06-02 NOTE — PROGRESS NOTES
Soledad Murray is a 68 y.o. male  Chief Complaint   Patient presents with    Medicare AW     Patient here for Annual Wellness Exam.          YES Answers must have Comments  1. \"Have you been to the ER, urgent care clinic since your last visit? Hospitalized since your last visit? \"    [x] YES When: few weeks ago Where: [facility] Reason for visit: hives  [] NO       2. Have you seen or consulted any other health care providers outside of [address] [place] [address] since your last visit?     [] YES   [x] NO       3. For patients aged 39-70: Have you had a colorectal cancer screening such as a colonoscopy/FIT/Cologuard? Nurse/CMA to request records if not in chart   [x] YES 2021  [] NO   [] NA, based on age    If the patient is female:      4. For female patients aged 41-77: Vidhi Montejo you had a mammogram in the last two years?  Nurse/CMA to request records if not in chart   [] YES   [] NO   [x] NA, based on gender    5. For female patients aged 21-65: iVdhi Montejo you had a pap smear?   Nurse/CMA to request records if not in chart   [] YES   [] NO  [x] NA, based on gender

## 2023-06-03 RX ORDER — ASPIRIN 81 MG/1
81 TABLET, CHEWABLE ORAL DAILY
Qty: 30 TABLET | Refills: 11 | Status: SHIPPED | OUTPATIENT
Start: 2023-06-03

## 2023-06-03 NOTE — PATIENT INSTRUCTIONS
within your After Visit Summary for your review. Other Preventive Recommendations:    A preventive eye exam performed by an eye specialist is recommended every 1-2 years to screen for glaucoma; cataracts, macular degeneration, and other eye disorders. A preventive dental visit is recommended every 6 months. Try to get at least 150 minutes of exercise per week or 10,000 steps per day on a pedometer . Order or download the FREE \"Exercise & Physical Activity: Your Everyday Guide\" from [employer]. Call 7-249.987.3738 or search [employer] online. You need 1437-2017 mg of calcium and 8620-2175 IU of vitamin D per day. It is possible to meet your calcium requirement with diet alone, but a vitamin D supplement is usually necessary to meet this goal.  When exposed to the sun, use a sunscreen that protects against both UVA and UVB radiation with an SPF of 30 or greater. Reapply every 2 to 3 hours or after sweating, drying off with a towel, or swimming. Always wear a seat belt when traveling in a car. Always wear a helmet when riding a bicycle or motorcycle.

## 2023-08-08 ENCOUNTER — TELEPHONE (OUTPATIENT)
Facility: CLINIC | Age: 73
End: 2023-08-08

## 2023-08-08 NOTE — TELEPHONE ENCOUNTER
----- Message from Davis Morgan sent at 8/8/2023  3:54 PM EDT -----  Subject: Referral Request    Reason for referral request? Patient is asking for a referral [facility] [address] Phone [phone]. He is asking to have this   done before monday   Provider patient wants to be referred to(if known):     Provider Phone Number(if known):     Additional Information for Provider?   ---------------------------------------------------------------------------  --------------  [address]    [phone]; OK to leave message on voicemail  ---------------------------------------------------------------------------  --------------

## 2023-08-16 ENCOUNTER — TELEPHONE (OUTPATIENT)
Facility: CLINIC | Age: 73
End: 2023-08-16

## 2023-08-16 NOTE — TELEPHONE ENCOUNTER
Called patient, no answer so office has left a voice message in regards to a referral that patient is requesting that is not in his chart , need diagnosis.

## 2023-08-30 DIAGNOSIS — G62.9 NEUROPATHY: Primary | ICD-10-CM

## 2023-11-08 ENCOUNTER — OFFICE VISIT (OUTPATIENT)
Facility: CLINIC | Age: 73
End: 2023-11-08
Payer: MEDICARE

## 2023-11-08 VITALS
SYSTOLIC BLOOD PRESSURE: 120 MMHG | TEMPERATURE: 97.8 F | BODY MASS INDEX: 33.47 KG/M2 | WEIGHT: 233.8 LBS | RESPIRATION RATE: 16 BRPM | HEIGHT: 70 IN | DIASTOLIC BLOOD PRESSURE: 67 MMHG | HEART RATE: 62 BPM | OXYGEN SATURATION: 99 %

## 2023-11-08 DIAGNOSIS — Z12.5 SPECIAL SCREENING FOR MALIGNANT NEOPLASM OF PROSTATE: ICD-10-CM

## 2023-11-08 DIAGNOSIS — E11.3393 CONTROLLED TYPE 2 DIABETES MELLITUS WITH BOTH EYES AFFECTED BY MODERATE NONPROLIFERATIVE RETINOPATHY WITHOUT MACULAR EDEMA, WITH LONG-TERM CURRENT USE OF INSULIN (HCC): Primary | ICD-10-CM

## 2023-11-08 DIAGNOSIS — E66.9 OBESITY (BMI 30.0-34.9): ICD-10-CM

## 2023-11-08 DIAGNOSIS — E78.5 DYSLIPIDEMIA: ICD-10-CM

## 2023-11-08 DIAGNOSIS — E03.9 ACQUIRED HYPOTHYROIDISM: ICD-10-CM

## 2023-11-08 DIAGNOSIS — I10 ESSENTIAL HYPERTENSION: ICD-10-CM

## 2023-11-08 DIAGNOSIS — Z79.4 CONTROLLED TYPE 2 DIABETES MELLITUS WITH BOTH EYES AFFECTED BY MODERATE NONPROLIFERATIVE RETINOPATHY WITHOUT MACULAR EDEMA, WITH LONG-TERM CURRENT USE OF INSULIN (HCC): Primary | ICD-10-CM

## 2023-11-08 PROCEDURE — 1123F ACP DISCUSS/DSCN MKR DOCD: CPT | Performed by: INTERNAL MEDICINE

## 2023-11-08 PROCEDURE — 3078F DIAST BP <80 MM HG: CPT | Performed by: INTERNAL MEDICINE

## 2023-11-08 PROCEDURE — G8427 DOCREV CUR MEDS BY ELIG CLIN: HCPCS | Performed by: INTERNAL MEDICINE

## 2023-11-08 PROCEDURE — 3074F SYST BP LT 130 MM HG: CPT | Performed by: INTERNAL MEDICINE

## 2023-11-08 PROCEDURE — 2022F DILAT RTA XM EVC RTNOPTHY: CPT | Performed by: INTERNAL MEDICINE

## 2023-11-08 PROCEDURE — 99214 OFFICE O/P EST MOD 30 MIN: CPT | Performed by: INTERNAL MEDICINE

## 2023-11-08 PROCEDURE — G8417 CALC BMI ABV UP PARAM F/U: HCPCS | Performed by: INTERNAL MEDICINE

## 2023-11-08 PROCEDURE — 36415 COLL VENOUS BLD VENIPUNCTURE: CPT | Performed by: INTERNAL MEDICINE

## 2023-11-08 PROCEDURE — 3017F COLORECTAL CA SCREEN DOC REV: CPT | Performed by: INTERNAL MEDICINE

## 2023-11-08 PROCEDURE — 3044F HG A1C LEVEL LT 7.0%: CPT | Performed by: INTERNAL MEDICINE

## 2023-11-08 PROCEDURE — G8484 FLU IMMUNIZE NO ADMIN: HCPCS | Performed by: INTERNAL MEDICINE

## 2023-11-08 PROCEDURE — 1036F TOBACCO NON-USER: CPT | Performed by: INTERNAL MEDICINE

## 2023-11-08 ASSESSMENT — ANXIETY QUESTIONNAIRES
6. BECOMING EASILY ANNOYED OR IRRITABLE: 0
GAD7 TOTAL SCORE: 0
3. WORRYING TOO MUCH ABOUT DIFFERENT THINGS: 0
1. FEELING NERVOUS, ANXIOUS, OR ON EDGE: 0
5. BEING SO RESTLESS THAT IT IS HARD TO SIT STILL: 0
4. TROUBLE RELAXING: 0
2. NOT BEING ABLE TO STOP OR CONTROL WORRYING: 0
7. FEELING AFRAID AS IF SOMETHING AWFUL MIGHT HAPPEN: 0
IF YOU CHECKED OFF ANY PROBLEMS ON THIS QUESTIONNAIRE, HOW DIFFICULT HAVE THESE PROBLEMS MADE IT FOR YOU TO DO YOUR WORK, TAKE CARE OF THINGS AT HOME, OR GET ALONG WITH OTHER PEOPLE: NOT DIFFICULT AT ALL

## 2023-11-08 ASSESSMENT — PATIENT HEALTH QUESTIONNAIRE - PHQ9
SUM OF ALL RESPONSES TO PHQ QUESTIONS 1-9: 0
1. LITTLE INTEREST OR PLEASURE IN DOING THINGS: 0
SUM OF ALL RESPONSES TO PHQ9 QUESTIONS 1 & 2: 0
SUM OF ALL RESPONSES TO PHQ QUESTIONS 1-9: 0
SUM OF ALL RESPONSES TO PHQ QUESTIONS 1-9: 0
2. FEELING DOWN, DEPRESSED OR HOPELESS: 0
SUM OF ALL RESPONSES TO PHQ QUESTIONS 1-9: 0

## 2023-11-09 LAB
ALBUMIN SERPL-MCNC: 3.5 G/DL (ref 3.5–5)
ALBUMIN/GLOB SERPL: 1.2 (ref 1.1–2.2)
ALP SERPL-CCNC: 131 U/L (ref 45–117)
ALT SERPL-CCNC: 15 U/L (ref 12–78)
ANION GAP SERPL CALC-SCNC: 5 MMOL/L (ref 5–15)
APPEARANCE UR: CLEAR
AST SERPL-CCNC: 14 U/L (ref 15–37)
BACTERIA URNS QL MICRO: NEGATIVE /HPF
BASOPHILS # BLD: 0 K/UL (ref 0–0.1)
BASOPHILS NFR BLD: 1 % (ref 0–1)
BILIRUB SERPL-MCNC: 0.6 MG/DL (ref 0.2–1)
BILIRUB UR QL: NEGATIVE
BUN SERPL-MCNC: 19 MG/DL (ref 6–20)
BUN/CREAT SERPL: 16 (ref 12–20)
CALCIUM SERPL-MCNC: 8.7 MG/DL (ref 8.5–10.1)
CHLORIDE SERPL-SCNC: 107 MMOL/L (ref 97–108)
CHOLEST SERPL-MCNC: 96 MG/DL
CO2 SERPL-SCNC: 28 MMOL/L (ref 21–32)
COLOR UR: NORMAL
CREAT SERPL-MCNC: 1.16 MG/DL (ref 0.7–1.3)
CRP SERPL HS-MCNC: 0.7 MG/L
DIFFERENTIAL METHOD BLD: NORMAL
EOSINOPHIL # BLD: 0.2 K/UL (ref 0–0.4)
EOSINOPHIL NFR BLD: 4 % (ref 0–7)
EPITH CASTS URNS QL MICRO: NORMAL /LPF
ERYTHROCYTE [DISTWIDTH] IN BLOOD BY AUTOMATED COUNT: 13 % (ref 11.5–14.5)
EST. AVERAGE GLUCOSE BLD GHB EST-MCNC: 131 MG/DL
GLOBULIN SER CALC-MCNC: 2.9 G/DL (ref 2–4)
GLUCOSE SERPL-MCNC: 111 MG/DL (ref 65–100)
GLUCOSE UR STRIP.AUTO-MCNC: NEGATIVE MG/DL
HBA1C MFR BLD: 6.2 % (ref 4–5.6)
HCT VFR BLD AUTO: 46.1 % (ref 36.6–50.3)
HDLC SERPL-MCNC: 36 MG/DL
HDLC SERPL: 2.7 (ref 0–5)
HGB BLD-MCNC: 14.3 G/DL (ref 12.1–17)
HGB UR QL STRIP: NEGATIVE
HYALINE CASTS URNS QL MICRO: NORMAL /LPF (ref 0–5)
IMM GRANULOCYTES # BLD AUTO: 0 K/UL (ref 0–0.04)
IMM GRANULOCYTES NFR BLD AUTO: 0 % (ref 0–0.5)
KETONES UR QL STRIP.AUTO: NEGATIVE MG/DL
LDLC SERPL CALC-MCNC: 47.4 MG/DL (ref 0–100)
LEUKOCYTE ESTERASE UR QL STRIP.AUTO: NEGATIVE
LYMPHOCYTES # BLD: 1.9 K/UL (ref 0.8–3.5)
LYMPHOCYTES NFR BLD: 40 % (ref 12–49)
MCH RBC QN AUTO: 28.1 PG (ref 26–34)
MCHC RBC AUTO-ENTMCNC: 31 G/DL (ref 30–36.5)
MCV RBC AUTO: 90.7 FL (ref 80–99)
MONOCYTES # BLD: 0.4 K/UL (ref 0–1)
MONOCYTES NFR BLD: 9 % (ref 5–13)
NEUTS SEG # BLD: 2.2 K/UL (ref 1.8–8)
NEUTS SEG NFR BLD: 46 % (ref 32–75)
NITRITE UR QL STRIP.AUTO: NEGATIVE
NRBC # BLD: 0 K/UL (ref 0–0.01)
NRBC BLD-RTO: 0 PER 100 WBC
PH UR STRIP: 5.5 (ref 5–8)
PLATELET # BLD AUTO: 293 K/UL (ref 150–400)
PMV BLD AUTO: 10.3 FL (ref 8.9–12.9)
POTASSIUM SERPL-SCNC: 4.3 MMOL/L (ref 3.5–5.1)
PROT SERPL-MCNC: 6.4 G/DL (ref 6.4–8.2)
PROT UR STRIP-MCNC: NEGATIVE MG/DL
PSA SERPL-MCNC: 0.5 NG/ML (ref 0.01–4)
RBC # BLD AUTO: 5.08 M/UL (ref 4.1–5.7)
RBC #/AREA URNS HPF: NORMAL /HPF (ref 0–5)
SODIUM SERPL-SCNC: 140 MMOL/L (ref 136–145)
SP GR UR REFRACTOMETRY: 1.02 (ref 1–1.03)
TRIGL SERPL-MCNC: 63 MG/DL
TSH SERPL DL<=0.05 MIU/L-ACNC: 1.99 UIU/ML (ref 0.36–3.74)
UROBILINOGEN UR QL STRIP.AUTO: 0.2 EU/DL (ref 0.2–1)
VLDLC SERPL CALC-MCNC: 12.6 MG/DL
WBC # BLD AUTO: 4.8 K/UL (ref 4.1–11.1)
WBC URNS QL MICRO: NORMAL /HPF (ref 0–4)

## 2023-11-11 LAB — APO B SERPL-MCNC: 55 MG/DL

## 2023-11-24 ENCOUNTER — TELEPHONE (OUTPATIENT)
Facility: CLINIC | Age: 73
End: 2023-11-24

## 2023-11-24 NOTE — TELEPHONE ENCOUNTER
Patient notified and ask per Dr. Corbin Tee to decrease his insulin to 26 ac breakfast and 20 unit ac dinner

## 2023-11-24 NOTE — TELEPHONE ENCOUNTER
----- Message from Elsy Drummond MD sent at 11/19/2023 11:00 AM EST -----  Tell patient to reduce insulin from current dose to 26 AC breakfast and dinner to 26 AC breakfast and 20 units AC

## 2023-12-23 DIAGNOSIS — E78.5 HYPERLIPIDEMIA, UNSPECIFIED: ICD-10-CM

## 2023-12-27 RX ORDER — LISINOPRIL 20 MG/1
20 TABLET ORAL DAILY
Qty: 90 TABLET | Refills: 3 | Status: SHIPPED | OUTPATIENT
Start: 2023-12-27

## 2023-12-27 RX ORDER — ATORVASTATIN CALCIUM 40 MG/1
40 TABLET, FILM COATED ORAL DAILY
Qty: 90 TABLET | Refills: 3 | Status: SHIPPED | OUTPATIENT
Start: 2023-12-27

## 2023-12-27 RX ORDER — LEVOTHYROXINE SODIUM 0.1 MG/1
TABLET ORAL
Qty: 90 TABLET | Refills: 3 | Status: SHIPPED | OUTPATIENT
Start: 2023-12-27

## 2024-01-24 RX ORDER — CALCIUM CITRATE/VITAMIN D3 200MG-6.25
TABLET ORAL
Qty: 300 STRIP | Refills: 3 | Status: SHIPPED | OUTPATIENT
Start: 2024-01-24

## 2024-01-24 RX ORDER — GLUCOSAM/CHON-MSM1/C/MANG/BOSW 500-416.6
TABLET ORAL
Qty: 300 EACH | Refills: 3 | Status: SHIPPED | OUTPATIENT
Start: 2024-01-24

## 2024-03-07 ENCOUNTER — OFFICE VISIT (OUTPATIENT)
Facility: CLINIC | Age: 74
End: 2024-03-07

## 2024-03-07 VITALS
HEIGHT: 71 IN | OXYGEN SATURATION: 98 % | TEMPERATURE: 97.7 F | WEIGHT: 239.2 LBS | HEART RATE: 60 BPM | SYSTOLIC BLOOD PRESSURE: 113 MMHG | BODY MASS INDEX: 33.49 KG/M2 | RESPIRATION RATE: 16 BRPM | DIASTOLIC BLOOD PRESSURE: 66 MMHG

## 2024-03-07 DIAGNOSIS — E66.09 CLASS 1 OBESITY DUE TO EXCESS CALORIES WITH SERIOUS COMORBIDITY AND BODY MASS INDEX (BMI) OF 30.0 TO 30.9 IN ADULT: ICD-10-CM

## 2024-03-07 DIAGNOSIS — E03.9 ACQUIRED HYPOTHYROIDISM: ICD-10-CM

## 2024-03-07 DIAGNOSIS — Z00.00 MEDICARE ANNUAL WELLNESS VISIT, SUBSEQUENT: ICD-10-CM

## 2024-03-07 DIAGNOSIS — I10 ESSENTIAL HYPERTENSION: ICD-10-CM

## 2024-03-07 DIAGNOSIS — E11.3393 CONTROLLED TYPE 2 DIABETES MELLITUS WITH BOTH EYES AFFECTED BY MODERATE NONPROLIFERATIVE RETINOPATHY WITHOUT MACULAR EDEMA, WITH LONG-TERM CURRENT USE OF INSULIN (HCC): Primary | ICD-10-CM

## 2024-03-07 DIAGNOSIS — E78.5 DYSLIPIDEMIA: ICD-10-CM

## 2024-03-07 DIAGNOSIS — I87.2 VENOUS INSUFFICIENCY: ICD-10-CM

## 2024-03-07 DIAGNOSIS — Z79.4 CONTROLLED TYPE 2 DIABETES MELLITUS WITH BOTH EYES AFFECTED BY MODERATE NONPROLIFERATIVE RETINOPATHY WITHOUT MACULAR EDEMA, WITH LONG-TERM CURRENT USE OF INSULIN (HCC): Primary | ICD-10-CM

## 2024-03-07 SDOH — ECONOMIC STABILITY: FOOD INSECURITY: WITHIN THE PAST 12 MONTHS, THE FOOD YOU BOUGHT JUST DIDN'T LAST AND YOU DIDN'T HAVE MONEY TO GET MORE.: NEVER TRUE

## 2024-03-07 SDOH — ECONOMIC STABILITY: FOOD INSECURITY: WITHIN THE PAST 12 MONTHS, YOU WORRIED THAT YOUR FOOD WOULD RUN OUT BEFORE YOU GOT MONEY TO BUY MORE.: NEVER TRUE

## 2024-03-07 SDOH — ECONOMIC STABILITY: INCOME INSECURITY: HOW HARD IS IT FOR YOU TO PAY FOR THE VERY BASICS LIKE FOOD, HOUSING, MEDICAL CARE, AND HEATING?: NOT HARD AT ALL

## 2024-03-07 ASSESSMENT — PATIENT HEALTH QUESTIONNAIRE - PHQ9
1. LITTLE INTEREST OR PLEASURE IN DOING THINGS: 0
2. FEELING DOWN, DEPRESSED OR HOPELESS: 0
SUM OF ALL RESPONSES TO PHQ QUESTIONS 1-9: 0
SUM OF ALL RESPONSES TO PHQ QUESTIONS 1-9: 0
SUM OF ALL RESPONSES TO PHQ9 QUESTIONS 1 & 2: 0
SUM OF ALL RESPONSES TO PHQ QUESTIONS 1-9: 0
SUM OF ALL RESPONSES TO PHQ QUESTIONS 1-9: 0

## 2024-03-07 ASSESSMENT — ANXIETY QUESTIONNAIRES
5. BEING SO RESTLESS THAT IT IS HARD TO SIT STILL: 0
3. WORRYING TOO MUCH ABOUT DIFFERENT THINGS: 0
IF YOU CHECKED OFF ANY PROBLEMS ON THIS QUESTIONNAIRE, HOW DIFFICULT HAVE THESE PROBLEMS MADE IT FOR YOU TO DO YOUR WORK, TAKE CARE OF THINGS AT HOME, OR GET ALONG WITH OTHER PEOPLE: NOT DIFFICULT AT ALL
1. FEELING NERVOUS, ANXIOUS, OR ON EDGE: 0
GAD7 TOTAL SCORE: 0
2. NOT BEING ABLE TO STOP OR CONTROL WORRYING: 0
7. FEELING AFRAID AS IF SOMETHING AWFUL MIGHT HAPPEN: 0
4. TROUBLE RELAXING: 0
6. BECOMING EASILY ANNOYED OR IRRITABLE: 0

## 2024-03-07 ASSESSMENT — LIFESTYLE VARIABLES
HOW MANY STANDARD DRINKS CONTAINING ALCOHOL DO YOU HAVE ON A TYPICAL DAY: PATIENT DOES NOT DRINK
HOW OFTEN DO YOU HAVE A DRINK CONTAINING ALCOHOL: NEVER

## 2024-03-07 NOTE — PROGRESS NOTES
Chief Complaint   Patient presents with    Medicare AWV     \"Have you been to the ER, urgent care clinic since your last visit?  Hospitalized since your last visit?\"    NO    “Have you seen or consulted any other health care providers outside of [facility] since your last visit?”    NO         
problems were reviewed today and where indicated follow up appointments were made and/or referrals ordered.    Positive Risk Factor Screenings with Interventions:                Activity, Diet, and Weight:  On average, how many days per week do you engage in moderate to strenuous exercise (like a brisk walk)?: 3 days  On average, how many minutes do you engage in exercise at this level?: 60 min    Do you eat balanced/healthy meals regularly?: Yes    Body mass index is 33.36 kg/m². (!) Abnormal    Obesity Interventions:  Emphasis placed on eating meals, limiting snacks, and avoiding the consumption of processed carbohydrates                 Advanced Directives:  Do you have a Living Will?: (!) No    Intervention:                       Objective   Vitals:    03/07/24 1421   BP: 113/66   Site: Left Upper Arm   Position: Sitting   Cuff Size: Large Adult   Pulse: 60   Resp: 16   Temp: 97.7 °F (36.5 °C)   TempSrc: Oral   SpO2: 98%   Weight: 108.5 kg (239 lb 3.2 oz)   Height: 1.803 m (5' 11\")      Body mass index is 33.36 kg/m².               No Known Allergies  Prior to Visit Medications    Medication Sig Taking? Authorizing Provider   TRUE METRIX BLOOD GLUCOSE TEST strip TEST BLOOD SUGAR THREE TIMES DAILY Yes Meño Black MD   TRUEplus Lancets 33G MISC USE TO TEST BLOOD SUGAR THREE TIMES A DAY Yes Meño Black MD   atorvastatin (LIPITOR) 40 MG tablet TAKE 1 TABLET EVERY DAY Yes Meño Black MD   levothyroxine (SYNTHROID) 100 MCG tablet TAKE 1 TABLET EVERY DAY BEFORE BREAKFAST Yes Meño Black MD   lisinopril (PRINIVIL;ZESTRIL) 20 MG tablet TAKE 1 TABLET EVERY DAY Yes Meño Black MD   coenzyme Q10 100 MG CAPS capsule Take 1 capsule by mouth daily Yes Automatic Reconciliation, Ar   insulin aspart protamine-insulin aspart (NOVOLOG 70/30) (70-30) 100 UNIT/ML injection Inject 36 units twice a day Yes Automatic Reconciliation, Ar   Insulin NPH Isophane & Regular (HUMULIN 70/30 KWIKPEN) (70-30) 100 UNIT per

## 2024-03-08 LAB
EST. AVERAGE GLUCOSE BLD GHB EST-MCNC: 148 MG/DL
HBA1C MFR BLD: 6.8 % (ref 4–5.6)

## 2024-04-11 RX ORDER — INSULIN ASPART 100 [IU]/ML
INJECTION, SUSPENSION SUBCUTANEOUS
Qty: 8 EACH | Refills: 11 | Status: SHIPPED | OUTPATIENT
Start: 2024-04-11

## 2024-04-11 RX ORDER — ISOPROPYL ALCOHOL 70 ML/100ML
SWAB TOPICAL
Qty: 100 EACH | Refills: 3 | Status: SHIPPED | OUTPATIENT
Start: 2024-04-11

## 2024-05-07 RX ORDER — ISOPROPYL ALCOHOL 70 ML/100ML
SWAB TOPICAL
Qty: 300 EACH | Refills: 3 | Status: SHIPPED | OUTPATIENT
Start: 2024-05-07

## 2024-05-07 RX ORDER — METFORMIN HYDROCHLORIDE 500 MG/1
500 TABLET, EXTENDED RELEASE ORAL 2 TIMES DAILY WITH MEALS
Qty: 180 TABLET | Refills: 3 | Status: SHIPPED | OUTPATIENT
Start: 2024-05-07

## 2024-05-07 RX ORDER — SYRINGE AND NEEDLE,INSULIN,1ML 31GX15/64"
SYRINGE, EMPTY DISPOSABLE MISCELLANEOUS
Qty: 200 EACH | Refills: 3 | Status: SHIPPED | OUTPATIENT
Start: 2024-05-07

## 2024-10-19 DIAGNOSIS — E78.5 HYPERLIPIDEMIA, UNSPECIFIED: ICD-10-CM

## 2024-10-22 RX ORDER — LISINOPRIL 20 MG/1
20 TABLET ORAL DAILY
Qty: 90 TABLET | Refills: 3 | Status: SHIPPED | OUTPATIENT
Start: 2024-10-22

## 2024-10-22 RX ORDER — ATORVASTATIN CALCIUM 40 MG/1
40 TABLET, FILM COATED ORAL DAILY
Qty: 90 TABLET | Refills: 3 | Status: SHIPPED | OUTPATIENT
Start: 2024-10-22

## 2024-10-22 RX ORDER — LEVOTHYROXINE SODIUM 100 UG/1
TABLET ORAL
Qty: 90 TABLET | Refills: 3 | Status: SHIPPED | OUTPATIENT
Start: 2024-10-22

## 2024-11-20 RX ORDER — CALCIUM CITRATE/VITAMIN D3 200MG-6.25
TABLET ORAL
Qty: 300 STRIP | Refills: 3 | Status: SHIPPED | OUTPATIENT
Start: 2024-11-20

## 2025-02-13 RX ORDER — SYRINGE AND NEEDLE,INSULIN,1ML 31GX15/64"
SYRINGE, EMPTY DISPOSABLE MISCELLANEOUS
Qty: 200 EACH | Refills: 3 | Status: SHIPPED | OUTPATIENT
Start: 2025-02-13

## 2025-02-13 RX ORDER — METFORMIN HYDROCHLORIDE 500 MG/1
500 TABLET, EXTENDED RELEASE ORAL 2 TIMES DAILY WITH MEALS
Qty: 180 TABLET | Refills: 3 | Status: SHIPPED | OUTPATIENT
Start: 2025-02-13

## 2025-04-17 RX ORDER — INSULIN ASPART 100 [IU]/ML
INJECTION, SUSPENSION SUBCUTANEOUS
Qty: 3 EACH | Refills: 11 | Status: SHIPPED | OUTPATIENT
Start: 2025-04-17

## 2025-04-28 RX ORDER — ISOPROPYL ALCOHOL 70 ML/100ML
SWAB TOPICAL
Qty: 100 EACH | Refills: 11 | Status: SHIPPED | OUTPATIENT
Start: 2025-04-28